# Patient Record
Sex: MALE | Race: WHITE | NOT HISPANIC OR LATINO | Employment: FULL TIME | URBAN - NONMETROPOLITAN AREA
[De-identification: names, ages, dates, MRNs, and addresses within clinical notes are randomized per-mention and may not be internally consistent; named-entity substitution may affect disease eponyms.]

---

## 2017-06-10 DIAGNOSIS — R56.9 SEIZURE (HCC): ICD-10-CM

## 2017-06-12 RX ORDER — PHENYTOIN SODIUM 100 MG/1
CAPSULE, EXTENDED RELEASE ORAL
Qty: 540 CAPSULE | Refills: 1 | OUTPATIENT
Start: 2017-06-12

## 2017-06-16 ENCOUNTER — OFFICE VISIT (OUTPATIENT)
Dept: FAMILY MEDICINE CLINIC | Facility: CLINIC | Age: 57
End: 2017-06-16

## 2017-06-16 VITALS
OXYGEN SATURATION: 98 % | BODY MASS INDEX: 40.66 KG/M2 | WEIGHT: 284 LBS | RESPIRATION RATE: 12 BRPM | TEMPERATURE: 98.3 F | HEIGHT: 70 IN | SYSTOLIC BLOOD PRESSURE: 144 MMHG | HEART RATE: 83 BPM | DIASTOLIC BLOOD PRESSURE: 90 MMHG

## 2017-06-16 DIAGNOSIS — R56.9 SEIZURE (HCC): ICD-10-CM

## 2017-06-16 DIAGNOSIS — E66.01 MORBID OBESITY WITH BMI OF 40.0-44.9, ADULT (HCC): ICD-10-CM

## 2017-06-16 DIAGNOSIS — R56.9 SEIZURES (HCC): Primary | ICD-10-CM

## 2017-06-16 PROCEDURE — 99214 OFFICE O/P EST MOD 30 MIN: CPT | Performed by: NURSE PRACTITIONER

## 2017-06-16 RX ORDER — PHENYTOIN SODIUM 100 MG/1
CAPSULE, EXTENDED RELEASE ORAL
Qty: 540 CAPSULE | Refills: 1 | Status: SHIPPED | OUTPATIENT
Start: 2017-06-16 | End: 2017-12-01 | Stop reason: SDUPTHER

## 2017-06-16 NOTE — PROGRESS NOTES
Chief Complaint   Patient presents with   • Seizures     labs and refills        No Known Allergies    HPI:  Brady Merlos is a 56 year old white male here for chronic visit for labs and med refills.  He has seizures and they are controlled with dilantin.  No complaints today, last seizure was > 30 yrs ago.  Has Sleep apnea       Past Medical History:   Diagnosis Date   • Hyperlipidemia    • Obesity    • Seizures    • Sleep apnea in adult      Past Surgical History:   Procedure Laterality Date   • SHOULDER SURGERY Left      Social History     Social History   • Marital status:      Spouse name: N/A   • Number of children: N/A   • Years of education: N/A     Social History Main Topics   • Smoking status: Never Smoker   • Smokeless tobacco: Never Used   • Alcohol use No   • Drug use: No   • Sexual activity: Defer     Other Topics Concern   • None     Social History Narrative     Family History   Problem Relation Age of Onset   • Diabetes Mother    • Hypertension Mother    • Diabetes Father    • Cancer Father    • No Known Problems Sister    • No Known Problems Brother    • Cancer Paternal Grandmother    • Heart disease Paternal Grandfather    • No Known Problems Sister    • No Known Problems Brother    • No Known Problems Sister    • No Known Problems Sister        Current Outpatient Prescriptions on File Prior to Visit   Medication Sig Dispense Refill   • DILANTIN 100 MG ER capsule 3 capsule in am 3 capsule pm  BRAND NAME ONLY  Indications: BRAND NAME ONLY 540 capsule 1     No current facility-administered medications on file prior to visit.         REVIEW OF SYMPTOMS: (Positives bolded), NEGATIVE TODAY  Respiratory: shortness of breath, cough, hemoptysis, wheezing, pleurisy,   Cardiovascular:  chest pain, PND, palpitation, edema, orthopnea, syncope, swelling of extremities  Gastro: Nausea, vomiting, diarrhea, hematemesis, abdominal pain, constipation  Genito: hematuria, dysuria, glycosuria, hesitancy,  "frequency, incontinence  Musckelo: Arthralgia, myalgia, muscle weakness, joint swelling, NSAID use  Skin: rash, pruritis, sores, nail changes, skin thickening, change in wart/mole, itching, rash, new lesions, pruritus, nail changes  Neuro:  Migraine, numbness, ataxia, tremor, vertigo, weakness, memory loss,  \"All other systems reviewed and negative, except as listed above.”      OBJECTIVE:  Constitutional:  Appearance-No acute distress, Consistent with stated age. Orientation- Oriented x 3, alert Posture-Not doubled over. Gait-Normal pace, normal arm movement. Posture- Normal Build and Nutrition-Well developed and well nourished.  General- Patient is pleasant and cooperative with the interview and exam.    Integumentary: General-No rashes, ulcers or lesions. No edema.  Palpation- Normal skin moisture/turgor. Skin is warm to touch, no increased warmth. Capillary refill is normal bilateral Upper and lower extremity.     Head/Neck: Head- normocephalic and atraumatic.  Neck- without visible/palpable lumps or pulsations.  Palpation- No bony tenderness about head/neck along frontal, occiptial, temporal, parietal, mastoid, jawline, zygoma, orbit or any other location.  NO temporal artery tenderness. No TMJ tenderness. Normal cervical ROM.   Neck Supple.  Thyroid-No thyromegaly, no nodules    Eye: Bilaterally PERRLA, EOMI.  No discharge.  Upper and lower eyelids are normal. Sclera/conjunctiva normal without discharge. Cornea is normal and clear. Lens is normal.  Eyeball appears normal. No ciliary flushing, no conjunctival injection.    ENMT:  Pinna- normal without tenderness or erythema.  External auditory canal Left- normal without erythema or discharge, no excessive cerumen. External auditory canal Right-normal without erythema or discharge, no excessive cerumen. TM left- Grey/pearly, normal light reflex and anatomy TM Right- Grey/pearly, normal light reflex and anatomy Hearing Assessment-normal to conversational speech " at 2-5 feet.  Nose and sinus-No sinus tenderness along frontal/maxillary region. External appearance normal and midline. Nares- bilateral quiet airflow, no discharge. Nasal mucosa- No bleeding noted and no ulcerations observed. Pink, moist. Turbinates non boggy. Lips- normal color, moist without cracks/lesions Oral Cavity/Palate- hard/soft palate intact without lesions, oral mucosa pink and moist. Dentition assessed and discussed appropriate oral care. Tongue normal midline.  Oropharynx- no pharyngeal erythema, Uvula midline. No post nasal drip. No exudate. Salivary glands- Non tender to palpation    CHEST/LUNG: Inspection- symmetric chest wall no pectus deformity. Normal effort, no distress, no use of accessory muscles. Palpation- nontender sternum, ribline.  No abnormal pulsations. Auscultation- Breath sounds normal throughout all lung fields.  Normal tracheal sounds, Normal bronchial sounds overlying sternum, Bronchovessicular sounds normal between scapulae posteriorly, Normal vessicular breath sounds heard throughout periphery. Lungs are clear today. Adventitious sounds- No wheezes, rales, rhonchi.     CARDIOVASCULAR:  Carotid artery- normal, no bruits or abnormal pulsations. Jugular vein- no pulsations. Palpation/Percussion- Normal PMI, no palpable thrill  Auscultation- Regular rate and rhythm. No murmur noted in sitting, supine positions. Extremities- no digital clubbing, cyanosis, edema, increased warmth.    ABDOMEN: Inspection- normal and no visible pulsations. Normal contour. Auscultation- Bowel sounds normal, no abdominal bruits. Palpation/Percussion- soft, non-tender, no rebound tenderness, no rigidity (guarding), no jar tenderness, no masses.  Liver-no hepatomegaly, Spleen - no splenomegaly, Hernias- none. Rectal not examined.     Peripheral Vascular: Upper extremity Left- Normal temperature with pink nailbeds and no ulcerations.  Upper extremity Right- Normal temperature with pink nailbeds and no  ulcerations.  Lower extremity- Normal temperature with pink nailbeds and no ulcerations. DP pulses 2+ bilaterally.  Pedal hair intact.  Normal capillary refill. Edema- No edema.    Neuro: - Person, place, time. (AAOx3), Mood/affect- normal and congruent. Able to articulate well. Speech-Normal speech, normal rate, normal tone, normal use of language, volume and coherence.  Thought content- normal with ability to perform basic computations and apply abstract thought/reason. Associations- intact, no SI/HI, no hallucinations, delusions, obsessions.  Judgment/insight- Appropriate. Memory-Recall intact, remote and recent memory intact. Knowledge- Age appropriate fund of knowledge, concentration and attention span normal.    Lymphatic: Head/Neck- normal size and non tender to palpation. Axillary- Head and neck LN are normal size and non tender to palpation. Femoral and Inguinal- normal size and non tender to palpation.      Assessment/Plan:  Brady was seen today for seizures.    Diagnoses and all orders for this visit:      Seizure-     Phenytoin Level, Total          -     DILANTIN 100 MG ER capsule; 3 capsule in am 3 capsule pm  BRAND NAME ONLY  Indications: BRAND NAME ONLY    Morbid obesity with BMI of 40.0-44.9, adult    1. Obesity: Federal guidelines recommend that people under the age of 65 should have a BMI of 18.5-24.9 and people age 65 and older should have a BMI of 23-30. The patient BMI 40.8 is outside this range and we recommended/discussed today to utilize a diet/exercise program to get back into the appropriate range.  Today we encouraged roughly a 1 lb per week weight loss with initial goal of 5% weight loss. The initial step is to document everything that is consumed into a food diary.  Studies have shown that patients can lose up to 2x the weight by keeping track of foods.     a. Discussed if eating out for a meal, consider cutting food in half and placing into to-go container.  Individually portion any  foods coming into the home based on package.    b. Consider using smaller plates.    c. Consider drinking 12 oz of water 30 minutes before meal as way to suppress appetite.   d. Cut back on soft drinks/juices.  Discussed 1 can of regular soft drink has roughly 150-170 Calories per day.    e. Increase exercise as able. It is recommended to try to exercise minimum 10 minutes 5 days per week.  The goal over the next 2-4 weeks is to walk 30 minutes per day 5 days per week at pace difficult to hold conversation.       Return in about 6 months (around 12/16/2017).     Jamee Medina, DNP, APRN-BC  06/16/2017

## 2017-06-16 NOTE — PATIENT INSTRUCTIONS
Nonepileptic Seizures  Nonepileptic seizures are seizures that are not caused by abnormal electrical signals in your brain. These seizures often seem like epileptic seizures, but they are not caused by epilepsy.   There are two types of nonepileptic seizures:  · A physiologic nonepileptic seizure results from a disruption in your brain.  · A psychogenic seizure results from emotional stress. These seizures are sometimes called pseudoseizures.  CAUSES   Causes of physiologic nonepileptic seizures include:   · Sudden drop in blood pressure.  · Low blood sugar.  · Low levels of salt (sodium) in your blood.  · Low levels of calcium in your blood.  · Migraine.  · Heart rhythm problems.  · Sleep disorders.  · Drug and alcohol abuse.  Common causes of psychogenic nonepileptic seizures include:  · Stress.  · Emotional trauma.  · Sexual or physical abuse.  · Major life events, such as divorce or the death of a loved one.  · Mental health disorders, including panic attack and hyperactivity disorder.  SIGNS AND SYMPTOMS  A nonepileptic seizure can look like an epileptic seizure, including uncontrollable shaking (convulsions), or changes in attention, behavior, or the ability to remain awake and alert. However, there are some differences. Nonepileptic seizures usually:  · Do not cause physical injuries.  · Start slowly.  · Include crying or shrieking.  · Last longer than 2 minutes.  · Have a short recovery time without headache or exhaustion.  DIAGNOSIS   Your health care provider can usually diagnose nonepileptic seizures after taking your medical history and giving you a physical exam. Your health care provider may want to talk to your friends or relatives who have seen you have a seizure.   You may also need to have tests to look for causes of physiologic nonepileptic seizures. This may include an electroencephalogram (EEG), which is a test that measures electrical activity in your brain. If you have had an epileptic  seizure, the results of your EEG will be abnormal. If your health care provider thinks you have had a psychogenic nonepileptic seizure, you may need to see a mental health specialist for an evaluation.  TREATMENT   Treatment depends on the type and cause of your seizures.  · For physiologic nonepileptic seizures, treatment is aimed at addressing the underlying condition that caused the seizures. These seizures usually stop when the underlying condition is properly treated.  · Nonepileptic seizures do not respond to the seizure medicines used to treat epilepsy.  · For psychogenic seizures, you may need to work with a mental health specialist.  HOME CARE INSTRUCTIONS  Home care will depend on the type of nonepileptic seizures you have.   · Follow all your health care provider's instructions.  · Keep all your follow-up appointments.  SEEK MEDICAL CARE IF:  You continue to have seizures after treatment.  SEEK IMMEDIATE MEDICAL CARE IF:  · Your seizures change or become more frequent.  · You injure yourself during a seizure.  · You have one seizure after another.  · You have trouble recovering from a seizure.  · You have chest pain or trouble breathing.  MAKE SURE YOU:  · Understand these instructions.  · Will watch your condition.  · Will get help right away if you are not doing well or get worse.     This information is not intended to replace advice given to you by your health care provider. Make sure you discuss any questions you have with your health care provider.     Document Released: 02/02/2007 Document Revised: 01/08/2016 Document Reviewed: 10/14/2014  [employer] Interactive Patient Education ©2017 [employer]

## 2017-06-17 LAB — PHENYTOIN SERPL-MCNC: 17.3 MCG/ML (ref 10–20)

## 2017-12-01 ENCOUNTER — OFFICE VISIT (OUTPATIENT)
Dept: FAMILY MEDICINE CLINIC | Facility: CLINIC | Age: 57
End: 2017-12-01

## 2017-12-01 VITALS
BODY MASS INDEX: 41.97 KG/M2 | RESPIRATION RATE: 18 BRPM | DIASTOLIC BLOOD PRESSURE: 82 MMHG | SYSTOLIC BLOOD PRESSURE: 138 MMHG | HEIGHT: 70 IN | OXYGEN SATURATION: 99 % | WEIGHT: 293.2 LBS | HEART RATE: 76 BPM | TEMPERATURE: 97.9 F

## 2017-12-01 DIAGNOSIS — R56.9 SEIZURE (HCC): ICD-10-CM

## 2017-12-01 PROCEDURE — 99213 OFFICE O/P EST LOW 20 MIN: CPT | Performed by: NURSE PRACTITIONER

## 2017-12-01 RX ORDER — PHENYTOIN SODIUM 100 MG/1
CAPSULE, EXTENDED RELEASE ORAL
Qty: 540 CAPSULE | Refills: 1 | Status: SHIPPED | OUTPATIENT
Start: 2017-12-01 | End: 2018-06-08 | Stop reason: SDUPTHER

## 2017-12-01 NOTE — PATIENT INSTRUCTIONS
Seizure, Adult  A seizure is unusual activity in the brain. A seizure can cause changes in attention or behavior. Seizures often cause shaking (convulsions). Seizures often last from 30 seconds to 2 minutes. There are different types of seizures. Before a seizure starts, you may have a warning feeling (aura). You may:  · Feel afraid or worried.  · Feel sick to your stomach (nauseous).  · Feel like the room is spinning (vertigo).  · Have vision changes. You may see flashing lights or spots.  After a seizure, you may feel confused or sleepy. You may also have an injury that was caused by shaking during the seizure.  HOME CARE  · Take over-the-counter and prescription medicines only as told by your doctor.  · Keep all follow-up visits as told by your doctor.  · Do not swim, drive, or do any activity that would cause danger to you or others if you would have a seizure during the activity. Ask your doctor when it is okay for you to do those activities again.  · Get enough rest.  · Teach your friends and family what to do if you have a seizure. They should:    Lay you on the ground.    Put a cushion or something soft under your head.    Loosen any tight clothing around your neck.    Turn you on your side.    Stay with you until you get better.    Know if you need emergency care.    Not put objects or fingers in your mouth.  GET HELP RIGHT AWAY IF:  · The seizure lasts longer than 5 minutes.  · The seizure is very bad.  · You do not wake up right after the seizure.  · You have a change in how you think, feel, or act (mental status) after the seizure.  · Your seizures are getting worse.  · You are having seizures more often.  Someone should drive you to the emergency room or call local emergency services (911 in the [place] Do not drive yourself to the hospital.     This information is not intended to replace advice given to you by your health care provider. Make sure you discuss any questions you have with your health care  provider.     Document Released: 06/05/2009 Document Revised: 04/10/2017 Document Reviewed: 07/30/2014  [employer] Interactive Patient Education ©2017 [employer]

## 2017-12-01 NOTE — PROGRESS NOTES
Chief Complaint   Patient presents with   • Dilantin level     Patient is here today to have his Dilantin levels checked.       No Known Allergies    HPI:  Brady Merlos is a 56 y.o. male presents today follow up on seizures which is controlled with dilantin.  Has not had a seizure for 30 years.   No complaints today.     Past Medical History:   Diagnosis Date   • Hyperlipidemia    • Obesity    • Seizures    • Sleep apnea in adult      Past Surgical History:   Procedure Laterality Date   • SHOULDER SURGERY Left      Social History     Social History   • Marital status:      Spouse name: N/A   • Number of children: N/A   • Years of education: N/A     Social History Main Topics   • Smoking status: Never Smoker   • Smokeless tobacco: Never Used   • Alcohol use No   • Drug use: No   • Sexual activity: Defer     Other Topics Concern   • None     Social History Narrative     Family History   Problem Relation Age of Onset   • Diabetes Mother    • Hypertension Mother    • Diabetes Father    • Cancer Father    • No Known Problems Sister    • No Known Problems Brother    • Cancer Paternal Grandmother    • Heart disease Paternal Grandfather    • No Known Problems Sister    • No Known Problems Brother    • No Known Problems Sister    • No Known Problems Sister        Current Outpatient Prescriptions on File Prior to Visit   Medication Sig Dispense Refill   • DILANTIN 100 MG ER capsule 3 capsule in am 3 capsule pm  BRAND NAME ONLY  Indications: BRAND NAME ONLY 540 capsule 1     No current facility-administered medications on file prior to visit.         REVIEW OF SYMPTOMS: (Positives bolded) all negative  General:  weight loss, fever, chills, night sweats, fatigue, appetite loss  Respiratory: shortness of breath, cough, hemoptysis, wheezing, pleurisy,   Cardiovascular:  chest pain, PND, palpitation, edema, orthopnea, syncope, swelling of extremities  Gastro: Nausea, vomiting, diarrhea, hematemesis, abdominal pain,  "constipation  Genito: hematuria, dysuria, glycosuria, hesitancy, frequency, incontinence  Musckelo: Arthralgia, myalgia, muscle weakness, joint swelling, NSAID use  Skin: rash, pruritis, sores, nail changes, skin thickening, change in wart/mole, itching, rash, new lesions, pruritus, nail changes  Neuro:  Migraine, numbness, ataxia, tremor, vertigo, weakness, memory loss,  \"All other systems reviewed and negative, except as listed above.”      OBJECTIVE:  Constitutional:  Appearance-No acute distress, Consistent with stated age. Orientation- Oriented x 3, alert Posture-Not doubled over. Gait-Normal pace, normal arm movement. Posture- Normal Build and Nutrition-Well developed and well nourished.  General- Patient is pleasant and cooperative with the interview and exam.    Integumentary: General-No rashes, ulcers or lesions. No edema.  Palpation- Normal skin moisture/turgor. Skin is warm to touch, no increased warmth. Capillary refill is normal bilateral Upper and lower extremity.     Head/Neck: Head- normocephalic and atraumatic.  Neck- without visible/palpable lumps or pulsations.  Palpation- No bony tenderness about head/neck along frontal, occiptial, temporal, parietal, mastoid, jawline, zygoma, orbit or any other location.  NO temporal artery tenderness. No TMJ tenderness. Normal cervical ROM.   Neck Supple.  Thyroid-No thyromegaly, no nodules    Eye: Bilaterally PERRLA, EOMI.  No discharge.  Upper and lower eyelids are normal. Sclera/conjunctiva normal without discharge. Cornea is normal and clear. Lens is normal.  Eyeball appears normal. No ciliary flushing, no conjunctival injection.    ENMT:  Pinna- normal without tenderness or erythema.  External auditory canal Left- normal without erythema or discharge, no excessive cerumen. External auditory canal Right-normal without erythema or discharge, no excessive cerumen. TM left- Grey/pearly, normal light reflex and anatomy TM Right- Grey/pearly, normal light reflex " and anatomy Hearing Assessment-normal to conversational speech at 2-5 feet.  Nose and sinus-No sinus tenderness along frontal/maxillary region. External appearance normal and midline. Nares- bilateral quiet airflow, no discharge. Nasal mucosa- No bleeding noted and no ulcerations observed. Pink, moist. Turbinates non boggy. Lips- normal color, moist without cracks/lesions Oral Cavity/Palate- hard/soft palate intact without lesions, oral mucosa pink and moist. Dentition assessed and discussed appropriate oral care. Tongue normal midline.  Oropharynx- no pharyngeal erythema, Uvula midline. No post nasal drip. No exudate. Salivary glands- Non tender to palpation    CHEST/LUNG: Inspection- symmetric chest wall no pectus deformity. Normal effort, no distress, no use of accessory muscles. Palpation- nontender sternum, ribline.  No abnormal pulsations. Auscultation- Breath sounds normal throughout all lung fields.  Normal tracheal sounds, Normal bronchial sounds overlying sternum, Bronchovessicular sounds normal between scapulae posteriorly, Normal vessicular breath sounds heard throughout periphery. Lungs are clear today. Adventitious sounds- No wheezes, rales, rhonchi.     CARDIOVASCULAR:  Carotid artery- normal, no bruits or abnormal pulsations. Jugular vein- no pulsations. Palpation/Percussion- Normal PMI, no palpable thrill  Auscultation- Regular rate and rhythm. No murmur noted in sitting, supine positions. Extremities- no digital clubbing, cyanosis, edema, increased warmth.    Peripheral Vascular: Upper extremity Left- Normal temperature with pink nailbeds and no ulcerations.  Upper extremity Right- Normal temperature with pink nailbeds and no ulcerations.  Lower extremity- Normal temperature with pink nailbeds and no ulcerations. DP pulses 2+ bilaterally.  Pedal hair intact.  Normal capillary refill. Edema- No edema.      Neuropsych: Oriented- Person, place, time. (AAOx3), Mood/affect- normal and congruent. Able to  articulate well. Speech-Normal speech, normal rate, normal tone, normal use of language, volume and coherence.  Thought content- normal with ability to perform basic computations and apply abstract thought/reason. Associations- intact, no SI/HI, no hallucinations, delusions, obsessions.  Judgment/insight- Appropriate. Memory-Recall intact, remote and recent memory intact. Knowledge- Age appropriate fund of knowledge, concentration and attention span normal.    Lymphatic: Head/Neck- normal size and non tender to palpation. Axillary- Head and neck LN are normal size and non tender to palpation. Femoral and Inguinal- normal size and non tender to palpation.      Assessment/Plan:  Brady was seen today for dilantin level.    Diagnoses and all orders for this visit:    Seizure  -     DILANTIN 100 MG ER capsule; 3 capsule in am 3 capsule pm  BRAND NAME ONLY  Indications: BRAND NAME ONLY  -     Phenytoin level, total        -     Phenytoin level has been stable    Return in about 6 months (around 6/1/2018), or if symptoms worsen or fail to improve.     Jamee Medina, DNP, APRN-BC  12/01/2017

## 2017-12-02 LAB — PHENYTOIN SERPL-MCNC: 15.3 UG/ML (ref 10–20)

## 2018-05-31 DIAGNOSIS — R56.9 SEIZURE (HCC): ICD-10-CM

## 2018-05-31 RX ORDER — PHENYTOIN SODIUM 100 MG/1
CAPSULE, EXTENDED RELEASE ORAL
Qty: 540 CAPSULE | Refills: 1 | OUTPATIENT
Start: 2018-05-31

## 2018-06-08 ENCOUNTER — OFFICE VISIT (OUTPATIENT)
Dept: FAMILY MEDICINE CLINIC | Facility: CLINIC | Age: 58
End: 2018-06-08

## 2018-06-08 VITALS
BODY MASS INDEX: 40.89 KG/M2 | WEIGHT: 285.6 LBS | HEIGHT: 70 IN | HEART RATE: 77 BPM | RESPIRATION RATE: 18 BRPM | OXYGEN SATURATION: 98 % | SYSTOLIC BLOOD PRESSURE: 122 MMHG | DIASTOLIC BLOOD PRESSURE: 80 MMHG | TEMPERATURE: 98.2 F

## 2018-06-08 VITALS
HEIGHT: 70 IN | DIASTOLIC BLOOD PRESSURE: 80 MMHG | RESPIRATION RATE: 18 BRPM | BODY MASS INDEX: 40.89 KG/M2 | TEMPERATURE: 98.2 F | HEART RATE: 77 BPM | SYSTOLIC BLOOD PRESSURE: 122 MMHG | OXYGEN SATURATION: 98 % | WEIGHT: 285.6 LBS

## 2018-06-08 DIAGNOSIS — M79.604 PAIN IN BOTH LOWER EXTREMITIES: ICD-10-CM

## 2018-06-08 DIAGNOSIS — R56.9 SEIZURE (HCC): Primary | ICD-10-CM

## 2018-06-08 DIAGNOSIS — Z00.00 WELL ADULT EXAM: Primary | ICD-10-CM

## 2018-06-08 DIAGNOSIS — Z13.6 SCREENING FOR ISCHEMIC HEART DISEASE (IHD): ICD-10-CM

## 2018-06-08 DIAGNOSIS — Z13.6 SCREENING FOR CARDIOVASCULAR CONDITION: ICD-10-CM

## 2018-06-08 DIAGNOSIS — M79.605 PAIN IN BOTH LOWER EXTREMITIES: ICD-10-CM

## 2018-06-08 PROCEDURE — 99214 OFFICE O/P EST MOD 30 MIN: CPT | Performed by: NURSE PRACTITIONER

## 2018-06-08 PROCEDURE — 99396 PREV VISIT EST AGE 40-64: CPT | Performed by: NURSE PRACTITIONER

## 2018-06-08 RX ORDER — PHENYTOIN SODIUM 100 MG/1
CAPSULE, EXTENDED RELEASE ORAL
Qty: 540 CAPSULE | Refills: 1 | Status: SHIPPED | OUTPATIENT
Start: 2018-06-08 | End: 2018-11-25 | Stop reason: SDUPTHER

## 2018-06-08 RX ORDER — DICLOFENAC SODIUM 75 MG/1
75 TABLET, DELAYED RELEASE ORAL 2 TIMES DAILY
Qty: 60 TABLET | Refills: 0 | Status: SHIPPED | OUTPATIENT
Start: 2018-06-08 | End: 2018-07-09 | Stop reason: SDUPTHER

## 2018-06-08 NOTE — PATIENT INSTRUCTIONS
Knee Pain, Adult  Many things can cause knee pain. The pain often goes away on its own with time and rest. If the pain does not go away, tests may be done to find out what is causing the pain.  Follow these instructions at home:  Activity   · Rest your knee.  · Do not do things that cause pain.  · Avoid activities where both feet leave the ground at the same time (high-impact activities). Examples are running, jumping rope, and doing jumping jacks.  General instructions   · Take medicines only as told by your doctor.  · Raise (elevate) your knee when you are resting. Make sure your knee is higher than your heart.  · Sleep with a pillow under your knee.  · If told, put ice on the knee:  ¨ Put ice in a plastic bag.  ¨ Place a towel between your skin and the bag.  ¨ Leave the ice on for 20 minutes, 2-3 times a day.  · Ask your doctor if you should wear an elastic knee support.  · Lose weight if you are overweight. Being overweight can make your knee hurt more.  · Do not use any tobacco products. These include cigarettes, chewing tobacco, or electronic cigarettes. If you need help quitting, ask your doctor. Smoking may slow down healing.  Contact a doctor if:  · The pain does not stop.  · The pain changes or gets worse.  · You have a fever along with knee pain.  · Your knee gives out or locks up.  · Your knee swells, and becomes worse.  Get help right away if:  · Your knee feels warm.  · You cannot move your knee.  · You have very bad knee pain.  · You have chest pain.  · You have trouble breathing.  Summary  · Many things can cause knee pain. The pain often goes away on its own with time and rest.  · Avoid activities that put stress on your knee. These include running and jumping rope.  · Get help right away if you cannot move your knee, or if your knee feels warm, or if you have trouble breathing.  This information is not intended to replace advice given to you by your health care provider. Make sure you discuss any  questions you have with your health care provider.  Document Released: 03/16/2010 Document Revised: 12/12/2017 Document Reviewed: 12/12/2017  [employer] Interactive Patient Education © 2017 [employer]

## 2018-06-08 NOTE — PROGRESS NOTES
Chief Complaint   Patient presents with   • Seizures     Medication Refill           HPI  Brady Merlos is a 57 y.o. male presents today for follow up for seizures,  Has not had any seizures, doing well does complain of leg pain from time to time relieved by advil.  Rates pain 2/10      Chronic problems:  Seizures stable with dilantin.     PCP:  Jamee Medina, DNP, APRN    No Known Allergies    Past Medical History:   Diagnosis Date   • Hyperlipidemia    • Obesity    • Seizures    • Sleep apnea in adult        Past Surgical History:   Procedure Laterality Date   • SHOULDER SURGERY Left        Social History     Social History   • Marital status:      Social History Main Topics   • Smoking status: Never Smoker   • Smokeless tobacco: Never Used   • Alcohol use No   • Drug use: No   • Sexual activity: Defer     Other Topics Concern   • Not on file       Family History   Problem Relation Age of Onset   • Diabetes Mother    • Hypertension Mother    • Diabetes Father    • Cancer Father    • No Known Problems Sister    • No Known Problems Brother    • Cancer Paternal Grandmother    • Heart disease Paternal Grandfather    • No Known Problems Sister    • No Known Problems Brother    • No Known Problems Sister    • No Known Problems Sister        Current Outpatient Prescriptions on File Prior to Visit   Medication Sig Dispense Refill   • DILANTIN 100 MG ER capsule 3 capsule in am 3 capsule pm  BRAND NAME ONLY  Indications: BRAND NAME ONLY 540 capsule 1     No current facility-administered medications on file prior to visit.         REVIEW OF SYMPTOMS: (Positives bolded)    General:  weight loss, fever, chills, night sweats, fatigue, appetite loss  HEENT:  blurry vision, eye pain, eye discharge, dry eyes, decreased vision  Respiratory: shortness of breath, cough, hemoptysis, wheezing, pleurisy,   Cardiovascular:  chest pain, PND, palpitation, edema, orthopnea, syncope, swelling of extremities  Gastro: Nausea,  "vomiting, diarrhea, hematemesis, abdominal pain, constipation  Genito: hematuria, dysuria, glycosuria, hesitancy, frequency, incontinence  Musckelo: Arthralgia, myalgia, muscle weakness, joint swelling, NSAID use  Skin: rash, pruritis, sores, nail changes, skin thickening, change in wart/mole, itching, rash, new lesions, pruritus, nail changes  Neuro:  Migraine, numbness, ataxia, tremor, vertigo, weakness, memory loss  \"All other systems reviewed and negative, except as listed above.”      OBJECTIVE:  Constitutional:  Appearance-No acute distress, Consistent with stated age. Orientation- Oriented x 3, alert Posture-Not doubled over. Gait-Normal pace, normal arm movement. Posture- Normal Build and Nutrition-Well developed and well nourished.  General- Patient is pleasant and cooperative with the interview and exam.    Integumentary: General-No rashes, ulcers or lesions. No edema.  Palpation- Normal skin moisture/turgor. Skin is warm to touch, no increased warmth. Capillary refill is normal bilateral Upper and lower extremity.     Head/Neck: Head- normocephalic and atraumatic.  Neck- without visible/palpable lumps or pulsations.  Palpation- No bony tenderness about head/neck along frontal, occiptial, temporal, parietal, mastoid, jawline, zygoma, orbit or any other location.  NO temporal artery tenderness. No TMJ tenderness. Normal cervical ROM.   Neck Supple.  Thyroid-No thyromegaly, no nodules    Eye: Bilaterally PERRLA, EOMI.  No discharge.  Upper and lower eyelids are normal. Sclera/conjunctiva normal without discharge. Cornea is normal and clear. Lens is normal.  Eyeball appears normal. No ciliary flushing, no conjunctival injection.    ENMT:  Pinna- normal without tenderness or erythema.  External auditory canal Left- normal without erythema or discharge, no excessive cerumen. External auditory canal Right-normal without erythema or discharge, no excessive cerumen. TM left- Grey/pearly, normal light reflex and " anatomy TM Right- Grey/pearly, normal light reflex and anatomy Hearing Assessment-normal to conversational speech at 2-5 feet.  Nose and sinus-No sinus tenderness along frontal/maxillary region. External appearance normal and midline. Nares- bilateral quiet airflow, no discharge. Nasal mucosa- No bleeding noted and no ulcerations observed. Pink, moist. Turbinates non boggy. Lips- normal color, moist without cracks/lesions Oral Cavity/Palate- hard/soft palate intact without lesions, oral mucosa pink and moist. Dentition assessed and discussed appropriate oral care. Tongue normal midline.  Oropharynx- no pharyngeal erythema, Uvula midline. No post nasal drip. No exudate. Salivary glands- Non tender to palpation    CHEST/LUNG: Inspection- symmetric chest wall no pectus deformity. Normal effort, no distress, no use of accessory muscles. Palpation- nontender sternum, ribline.  No abnormal pulsations. Auscultation- Breath sounds normal throughout all lung fields.  Normal tracheal sounds, Normal bronchial sounds overlying sternum, Bronchovessicular sounds normal between scapulae posteriorly, Normal vessicular breath sounds heard throughout periphery. Lungs are clear today. Adventitious sounds- No wheezes, rales, rhonchi.     CARDIOVASCULAR:  Carotid artery- normal, no bruits or abnormal pulsations. Jugular vein- no pulsations. Palpation/Percussion- Normal PMI, no palpable thrill  Auscultation- Regular rate and rhythm. No murmur noted in sitting, supine positions. Extremities- no digital clubbing, cyanosis, edema, increased warmth.    ABDOMEN: Inspection- normal and no visible pulsations. Normal contour. Auscultation- Bowel sounds normal, no abdominal bruits. Palpation/Percussion- soft, non-tender, no rebound tenderness, no rigidity (guarding), no jar tenderness, no masses.  Liver-no hepatomegaly, Spleen - no splenomegaly, Hernias- none. Rectal not examined.     Peripheral Vascular: Upper extremity Left- Normal temperature  with pink nailbeds and no ulcerations.  Upper extremity Right- Normal temperature with pink nailbeds and no ulcerations.  Lower extremity- Normal temperature with pink nailbeds and no ulcerations. DP pulses 2+ bilaterally.  Pedal hair intact.  Normal capillary refill. Edema- No edema.    Neurological: General- Moves all 4 extremities symmetrically. Symmetrical face and body posture. Cranial nerves- individually evaluated II-XII and intact. PERRLA, Normal EOMI, visual/special senses appear intact, Face is symmetrical and normal sensation/movement, normal tongue, normal strength/posture of neck musculature. Balance- Romberg intact.  Reflexes- ntact with DTR 2+ patellar, Achilles, bicep, brachial,tricep. Ankle clonus normal with 2 beats.  Strength- 5/5 bilateral UE and LE. Soft touch- intact bilateral UE and LE.  Temperature sensation- intact bilateral UE and LE. Cerebellar testing-Rapid alternating movements intact.  Heel shin intact. Able to walk normal gait, normal heel toe walking. Neck- supple.        Bilateral Knees:   Has good rom, good pulses, straight leg raises normal, has tenderness on palpation of the whole knee bilateral.     Neuropsych: Oriented- Person, place, time. (AAOx3), Mood/affect- normal and congruent. Able to articulate well. Speech-Normal speech, normal rate, normal tone, normal use of language, volume and coherence.  Thought content- normal with ability to perform basic computations and apply abstract thought/reason. Associations- intact, no SI/HI, no hallucinations, delusions, obsessions.  Judgment/insight- Appropriate. Memory-Recall intact, remote and recent memory intact. Knowledge- Age appropriate fund of knowledge, concentration and attention span normal.    Lymphatic: Head/Neck- normal size and non tender to palpation. Axillary- Head and neck LN are normal size and non tender to palpation. Femoral and Inguinal- normal size and non tender to palpation.      Assessment/Plan:  Brady gallagher  seen today for seizures.    Diagnoses and all orders for this visit:    Seizure  -     DILANTIN 100 MG ER capsule; 3 capsule in am 3 capsule pm  BRAND NAME ONLY  -     Phenytoin Level, Total    Pain in both lower extremities  -     diclofenac (VOLTAREN) 75 MG EC tablet; Take 1 tablet by mouth 2 (Two) Times a Day.      Morbid obesity:  BMI:    41.0   Weight:     285 pounds    Follow up plan:  Lose at least 3 pounds before next chronic visit, exercise at least 3 times a week for 30 minute increments, eat baked instead of fried foods, and eat healthier     -  Documentation of education   The patient BMI is outside this range and we recommended/discussed today to utilize a diet/exercise program to get back into the appropriate range.  Federal guidelines recommend that people under the age of 65 should have a BMI of 18.5-24.9  Food diary:  Bring to next visit  tial step is to document everything that is consumed. Pt's that have a food diary  Lose 2x the weight  by keeping track of foods.   Choose one bad food weekly and eliminate it from your diet.  Replace with one healthy food  Exercise diary: Goal over next 2-4 weeks is walk 30 minutes per day 5 days per week at pace difficult to hold conversation.   Drink more water, less soda.   Cut back on portion sizes.   Today we encouraged roughly a 1 lb per week weight loss with initial goal of 5% weight loss.  Avoid fast foods, eat more salads  If eating out for a meal, consider cutting food in half and placing into a to go container.  Individually portion any foods coming into the home   Use smaller plates  Drink 12 ozof water 30 minutes before meal as way to suppress appetite.  Discussed Contrave, metformin, topamax, Belviq and the cost of medications  Encouraged internet programs or self help books  Set  Goals that are realistic   Educated on ways to measure food  Baseball: 1 cup good for green salad, frozen yogurt, medium piece of fruit  Handful:  ½ cup good cut fruit,  cooked vegetables, pasta, rice  Egg:  ¼ cup good for dried fruit  Deck of cards: 3 ounces good for meat and poultry  Check book:  3 ounces grilled fish  Plate Method:  reduce plate size to 9 inch dinner plate.  Half of plate should be filled with non-starchy vegetables (broccoli, lettuce, cauliflower, tomatoes), ¼ plate with lean source of protein (lean chicken, turkey, fish), remaining ½ with whole grains (brown rice, potato, whole grain breads  Avoid liquid calories (regular soda, juice, coffee with cream)  Focus  on water, seltzer water and other non-calorie drinks  Replace regular sugar with non-caloric sweeteners  Avoid skipping meals: plan small regular meals throughout the day in order to keep your hunger controlled  Consider using meal replacements if unable to plan a healthy meal (protein shake, high protein bar)  Replace all white bread with whole wheat/whole grain alternative  Swap regular salad dressings (mayonnaise, butter, or low fat or fat free alternative)  Avoid high fat, high calorie, high carbohydrate snakes (cookies, pastries, cakes)  Snack on fruits, low fat dairy (yogurt, cottage cheese)            Management plan:  Take medications as prescribed; return to the clinic of new or worsening concerns.       Risks/benefits of current and new medications discussed with the patient and or family today.  The patient/family are aware and accept that if there any side effects they should call or return to clinic as soon as possible.  Appropriate F/U discussed for topics addressed today. All questions were answered to the satisfactory state of patient/family.  Should symptoms fail to improve or worsen they agree to call or return to clinic or to go to the ER. Education handouts were offered on any new Rx if requested.  Discussed the importance of following up with any needed screening tests/labs/specialist appointments and any requested follow-up recommended by me today.  Importance of maintaining  follow-up discussed and patient accepts that missed appointments can delay diagnosis and potentially lead to worsening of conditions.    Return in about 6 months (around 12/8/2018) for Recheck seizures.    Jamee Medina, DNP, APRN  6/8/2018

## 2018-06-08 NOTE — PROGRESS NOTES
Chief Complaint   Patient presents with   • Annual Exam      No Known Allergies    HPIP: Brady Merlos is a 57 y.o. male presents today for annual exam.  Has chronic problems of seizures stable with Dilantin 100 (3) caps daily.   Has been having problems with knee and leg pain.  Rates 2/10    Past Medical History:   Diagnosis Date   • Hyperlipidemia    • Obesity    • Seizures    • Sleep apnea in adult      Past Surgical History:   Procedure Laterality Date   • SHOULDER SURGERY Left      Social History     Social History   • Marital status:      Social History Main Topics   • Smoking status: Never Smoker   • Smokeless tobacco: Never Used   • Alcohol use No   • Drug use: No   • Sexual activity: Defer     Other Topics Concern   • Not on file     Family History   Problem Relation Age of Onset   • Diabetes Mother    • Hypertension Mother    • Diabetes Father    • Cancer Father    • No Known Problems Sister    • No Known Problems Brother    • Cancer Paternal Grandmother    • Heart disease Paternal Grandfather    • No Known Problems Sister    • No Known Problems Brother    • No Known Problems Sister    • No Known Problems Sister        Current Outpatient Prescriptions on File Prior to Visit   Medication Sig Dispense Refill   • [DISCONTINUED] DILANTIN 100 MG ER capsule 3 capsule in am 3 capsule pm  BRAND NAME ONLY  Indications: BRAND NAME ONLY 540 capsule 1     No current facility-administered medications on file prior to visit.         REVIEW OF SYMPTOMS: (Positives bolded) All negative   General:  weight loss, fever, chills, night sweats, fatigue, appetite loss  HEENT:  blurry vision, eye pain, eye discharge, dry eyes, decreased vision  Respiratory: shortness of breath, cough, hemoptysis, wheezing, pleurisy,   Neuro:  Migraine, numbness, ataxia, tremor, vertigo, weakness, memory loss, Irritability, dizziness  Endocrine: excessive thirst, polyuria, cold intolerance, heat intolerance, goiter  Musco: bilateral knee  "pain  Psychiatric: depression, anxiety, anti-depressants, alcohol abuse, drug abuse,   \"All other systems reviewed and negative, except as listed above.”    The following portions of the patient's history were reviewed and updated as appropriate: allergies, current medications, past family history, past medical history, past social history, past surgical history and problem list.    OBJECTIVE:  Constitutional:  Appearance-No acute distress, Consistent with stated age. Orientation- Oriented x 3, alert Posture-Not doubled over. Gait-Normal pace, normal arm movement. Posture- Normal Build and Nutrition-Well developed and well nourished.  General- Patient is pleasant and cooperative with the interview and exam.    Integumentary: General-No rashes, ulcers or lesions. No edema.  Palpation- Normal skin moisture/turgor. Skin is warm to touch, no increased warmth. Capillary refill is normal bilateral Upper and lower extremity.     Head/Neck: Head- normocephalic and atraumatic.  Neck- without visible/palpable lumps or pulsations.  Palpation- No bony tenderness about head/neck along frontal, occiptial, temporal, parietal, mastoid, jawline, zygoma, orbit or any other location.  NO temporal artery tenderness. No TMJ tenderness. Normal cervical ROM.   Neck Supple.  Thyroid-No thyromegaly, no nodules    Eye: Bilaterally PERRLA, EOMI.  No discharge.  Upper and lower eyelids are normal. Sclera/conjunctiva normal without discharge. Cornea is normal and clear. Lens is normal.  Eyeball appears normal. No ciliary flushing, no conjunctival injection.    ENMT:  Pinna- normal without tenderness or erythema.  External auditory canal Left- normal without erythema or discharge, no excessive cerumen. External auditory canal Right-normal without erythema or discharge, no excessive cerumen. TM left- Grey/pearly, normal light reflex and anatomy TM Right- Grey/pearly, normal light reflex and anatomy Hearing Assessment-normal to conversational " speech at 2-5 feet.  Nose and sinus-No sinus tenderness along frontal/maxillary region. External appearance normal and midline. Nares- bilateral quiet airflow, no discharge. Nasal mucosa- No bleeding noted and no ulcerations observed. Pink, moist. Turbinates non boggy. Lips- normal color, moist without cracks/lesions Oral Cavity/Palate- hard/soft palate intact without lesions, oral mucosa pink and moist. Dentition assessed and discussed appropriate oral care. Tongue normal midline.  Oropharynx- no pharyngeal erythema, Uvula midline. No post nasal drip. No exudate. Salivary glands- Non tender to palpation    CHEST/LUNG: Inspection- symmetric chest wall no pectus deformity. Normal effort, no distress, no use of accessory muscles. Palpation- nontender sternum, ribline.  No abnormal pulsations. Auscultation- Breath sounds normal throughout all lung fields.  Normal tracheal sounds, Normal bronchial sounds overlying sternum, Bronchovessicular sounds normal between scapulae posteriorly, Normal vessicular breath sounds heard throughout periphery. Lungs are clear today. Adventitious sounds- No wheezes, rales, rhonchi.     CARDIOVASCULAR:  Carotid artery- normal, no bruits or abnormal pulsations. Jugular vein- no pulsations. Palpation/Percussion- Normal PMI, no palpable thrill  Auscultation- Regular rate and rhythm. No murmur noted in sitting, supine positions. Extremities- no digital clubbing, cyanosis, edema, increased warmth.    ABDOMEN: Inspection- normal and no visible pulsations. Normal contour. Auscultation- Bowel sounds normal, no abdominal bruits. Palpation/Percussion- soft, non-tender, no rebound tenderness, no rigidity (guarding), no jar tenderness, no masses.  Liver-no hepatomegaly, Spleen - no splenomegaly, Hernias- none. Rectal not examined.     Peripheral Vascular: Upper extremity Left- Normal temperature with pink nailbeds and no ulcerations.  Upper extremity Right- Normal temperature with pink nailbeds and no  "ulcerations.  Lower extremity- Normal temperature with pink nailbeds and no ulcerations. DP pulses 2+ bilaterally.  Pedal hair intact.  Normal capillary refill. Edema- No edema.    Neurological: General- Moves all 4 extremities symmetrically. Symmetrical face and body posture. Cranial nerves- individually evaluated II-XII and intact. PERRLA, Normal EOMI, visual/special senses appear intact, Face is symmetrical and normal sensation/movement, normal tongue, normal strength/posture of neck musculature. Balance- Romberg intact.  Reflexes- ntact with DTR 2+ patellar, Achilles, bicep, brachial,tricep. Ankle clonus normal with 2 beats.  Strength- 5/5 bilateral UE and LE. Soft touch- intact bilateral UE and LE.  Temperature sensation- intact bilateral UE and LE. Cerebellar testing-Rapid alternating movements intact.  Heel shin intact. Able to walk normal gait, normal heel toe walking. Neck- supple.      Neuropsych: Oriented- Person, place, time. (AAOx3), Mood/affect- normal and congruent. Able to articulate well. Speech-Normal speech, normal rate, normal tone, normal use of language, volume and coherence.  Thought content- normal with ability to perform basic computations and apply abstract thought/reason. Associations- intact, no SI/HI, no hallucinations, delusions, obsessions.  Judgment/insight- Appropriate. Memory-Recall intact, remote and recent memory intact. Knowledge- Age appropriate fund of knowledge, concentration and attention span normal.    Lymphatic: Head/Neck- normal size and non tender to palpation. Axillary- Head and neck LN are normal size and non tender to palpation. Femoral and Inguinal- normal size and non tender to palpation.    /80 (BP Location: Left arm, Patient Position: Sitting, Cuff Size: Large Adult)   Pulse 77   Temp 98.2 °F (36.8 °C) (Oral)   Resp 18   Ht 177.8 cm (70\")   Wt 130 kg (285 lb 9.6 oz)   SpO2 98%   BMI 40.98 kg/m²        ASSESSMENT/PLAN  Brady was seen today for annual " exam.    Diagnoses and all orders for this visit:    Well adult exam  -     Lipid panel  -     CBC and Differential  -     Comprehensive metabolic panel    Screening for cardiovascular condition  -     Lipid panel  -     CBC and Differential  -     Comprehensive metabolic panel    Screening for ischemic heart disease (IHD)      Return in about 1 year (around 6/8/2019) for Annual physical.    Jamee Medina, IVETTE, APRN-BC  06/08/2018

## 2018-06-09 LAB
ALBUMIN SERPL-MCNC: 4.3 G/DL (ref 3.5–5)
ALBUMIN/GLOB SERPL: 1.7 G/DL (ref 1.1–2.5)
ALP SERPL-CCNC: 81 U/L (ref 24–120)
ALT SERPL-CCNC: 47 U/L (ref 0–54)
AST SERPL-CCNC: 27 U/L (ref 7–45)
BASOPHILS # BLD AUTO: 0.01 10*3/MM3 (ref 0–0.2)
BASOPHILS NFR BLD AUTO: 0.2 % (ref 0–2)
BILIRUB SERPL-MCNC: 0.3 MG/DL (ref 0.1–1)
BUN SERPL-MCNC: 14 MG/DL (ref 5–21)
BUN/CREAT SERPL: 20.3 (ref 7–25)
CALCIUM SERPL-MCNC: 9.1 MG/DL (ref 8.4–10.4)
CHLORIDE SERPL-SCNC: 102 MMOL/L (ref 98–110)
CHOLEST SERPL-MCNC: 183 MG/DL (ref 130–200)
CO2 SERPL-SCNC: 30 MMOL/L (ref 24–31)
CREAT SERPL-MCNC: 0.69 MG/DL (ref 0.5–1.4)
EOSINOPHIL # BLD AUTO: 0.1 10*3/MM3 (ref 0–0.7)
EOSINOPHIL NFR BLD AUTO: 1.9 % (ref 0–4)
ERYTHROCYTE [DISTWIDTH] IN BLOOD BY AUTOMATED COUNT: 14.8 % (ref 12–15)
GFR SERPLBLD CREATININE-BSD FMLA CKD-EPI: 118 ML/MIN/1.73
GFR SERPLBLD CREATININE-BSD FMLA CKD-EPI: 143 ML/MIN/1.73
GLOBULIN SER CALC-MCNC: 2.5 GM/DL
GLUCOSE SERPL-MCNC: 94 MG/DL (ref 70–100)
HCT VFR BLD AUTO: 47.1 % (ref 40–52)
HDLC SERPL-MCNC: 56 MG/DL
HGB BLD-MCNC: 14.4 G/DL (ref 14–18)
IMM GRANULOCYTES # BLD: 0.03 10*3/MM3 (ref 0–0.03)
IMM GRANULOCYTES NFR BLD: 0.6 % (ref 0–5)
LDLC SERPL CALC-MCNC: 103 MG/DL (ref 0–99)
LYMPHOCYTES # BLD AUTO: 1.12 10*3/MM3 (ref 0.72–4.86)
LYMPHOCYTES NFR BLD AUTO: 21.8 % (ref 15–45)
MCH RBC QN AUTO: 28.7 PG (ref 28–32)
MCHC RBC AUTO-ENTMCNC: 30.6 G/DL (ref 33–36)
MCV RBC AUTO: 93.8 FL (ref 82–95)
MONOCYTES # BLD AUTO: 0.4 10*3/MM3 (ref 0.19–1.3)
MONOCYTES NFR BLD AUTO: 7.8 % (ref 4–12)
NEUTROPHILS # BLD AUTO: 3.47 10*3/MM3 (ref 1.87–8.4)
NEUTROPHILS NFR BLD AUTO: 67.7 % (ref 39–78)
NRBC BLD AUTO-RTO: 0 /100 WBC (ref 0–0)
PHENYTOIN SERPL-MCNC: 14.9 MCG/ML (ref 10–20)
PLATELET # BLD AUTO: 199 10*3/MM3 (ref 130–400)
POTASSIUM SERPL-SCNC: 4.4 MMOL/L (ref 3.5–5.3)
PROT SERPL-MCNC: 6.8 G/DL (ref 6.3–8.7)
RBC # BLD AUTO: 5.02 10*6/MM3 (ref 4.8–5.9)
SODIUM SERPL-SCNC: 142 MMOL/L (ref 135–145)
TRIGL SERPL-MCNC: 119 MG/DL (ref 0–149)
VLDLC SERPL CALC-MCNC: 23.8 MG/DL
WBC # BLD AUTO: 5.13 10*3/MM3 (ref 4.8–10.8)

## 2018-07-09 DIAGNOSIS — M79.604 PAIN IN BOTH LOWER EXTREMITIES: ICD-10-CM

## 2018-07-09 DIAGNOSIS — M79.605 PAIN IN BOTH LOWER EXTREMITIES: ICD-10-CM

## 2018-07-10 RX ORDER — DICLOFENAC SODIUM 75 MG/1
TABLET, DELAYED RELEASE ORAL
Qty: 60 TABLET | Refills: 0 | Status: SHIPPED | OUTPATIENT
Start: 2018-07-10 | End: 2018-07-29 | Stop reason: SDUPTHER

## 2018-07-29 DIAGNOSIS — M79.604 PAIN IN BOTH LOWER EXTREMITIES: ICD-10-CM

## 2018-07-29 DIAGNOSIS — M79.605 PAIN IN BOTH LOWER EXTREMITIES: ICD-10-CM

## 2018-07-30 RX ORDER — DICLOFENAC SODIUM 75 MG/1
TABLET, DELAYED RELEASE ORAL
Qty: 180 TABLET | Refills: 0 | Status: SHIPPED | OUTPATIENT
Start: 2018-07-30 | End: 2019-06-14 | Stop reason: SDUPTHER

## 2018-10-25 DIAGNOSIS — M79.605 PAIN IN BOTH LOWER EXTREMITIES: ICD-10-CM

## 2018-10-25 DIAGNOSIS — M79.604 PAIN IN BOTH LOWER EXTREMITIES: ICD-10-CM

## 2018-10-26 RX ORDER — DICLOFENAC SODIUM 75 MG/1
TABLET, DELAYED RELEASE ORAL
Qty: 180 TABLET | Refills: 0 | Status: SHIPPED | OUTPATIENT
Start: 2018-10-26 | End: 2019-01-23 | Stop reason: SDUPTHER

## 2018-11-25 DIAGNOSIS — R56.9 SEIZURE (HCC): ICD-10-CM

## 2018-11-26 RX ORDER — PHENYTOIN SODIUM 100 MG/1
CAPSULE, EXTENDED RELEASE ORAL
Qty: 540 CAPSULE | Refills: 1 | Status: SHIPPED | OUTPATIENT
Start: 2018-11-26 | End: 2019-06-14 | Stop reason: SDUPTHER

## 2018-12-14 ENCOUNTER — OFFICE VISIT (OUTPATIENT)
Dept: FAMILY MEDICINE CLINIC | Facility: CLINIC | Age: 58
End: 2018-12-14

## 2018-12-14 VITALS
DIASTOLIC BLOOD PRESSURE: 98 MMHG | HEART RATE: 86 BPM | OXYGEN SATURATION: 97 % | BODY MASS INDEX: 41.8 KG/M2 | TEMPERATURE: 98.5 F | HEIGHT: 70 IN | SYSTOLIC BLOOD PRESSURE: 154 MMHG | RESPIRATION RATE: 18 BRPM | WEIGHT: 292 LBS

## 2018-12-14 DIAGNOSIS — R56.9 SEIZURE (HCC): Primary | ICD-10-CM

## 2018-12-14 DIAGNOSIS — E66.01 MORBID OBESITY WITH BODY MASS INDEX (BMI) OF 40.0 TO 44.9 IN ADULT (HCC): ICD-10-CM

## 2018-12-14 PROCEDURE — 99213 OFFICE O/P EST LOW 20 MIN: CPT | Performed by: NURSE PRACTITIONER

## 2018-12-14 NOTE — PROGRESS NOTES
CC:  Seizure    Brady Merlos is a 57 yr old male here for follow up for seizures that are controlled with dilantin.  It has been 30 yrs since he has had a seizure.  He was out of medication in November and couldn't get in office so the script was actually sent 11/26/18.  Denies shortness of breath, no chest pain.        Seizures    This is a chronic problem. Episode onset: has had for 30 years. The problem has not changed since onset.Number of times: no seizure activity in 30 yrs. Duration: none in 30 yrs. Pertinent negatives include no sleepiness, no confusion, no headaches, no speech difficulty, no visual disturbance, no neck stiffness, no sore throat, no chest pain, no cough and no nausea. Characteristics do not include eye blinking, eye deviation, bowel incontinence, bladder incontinence, rhythmic jerking, loss of consciousness, bit tongue, apnea or cyanosis. The episode was not witnessed. Possible causes do not include medication or dosage change, sleep deprivation, missed seizure meds, recent illness or change in alcohol use. There has been no fever.        The following portions of the patient's history were reviewed and updated as appropriate: allergies, current medications, past family history, past medical history, past social history, past surgical history and problem list.    Review of Systems   Constitutional: Negative for chills, diaphoresis and fatigue.   HENT: Negative for sore throat.    Eyes: Negative.  Negative for visual disturbance.   Respiratory: Negative for apnea and cough.    Cardiovascular: Negative for chest pain and cyanosis.   Gastrointestinal: Negative for bowel incontinence and nausea.   Genitourinary: Negative for urinary incontinence.   Allergic/Immunologic: Negative.    Neurological: Negative for seizures, loss of consciousness, speech difficulty, numbness, memory problem and confusion.   Psychiatric/Behavioral: Negative for behavioral problems, decreased concentration and  dysphoric mood.   All other systems reviewed and are negative.      Objective   Physical Exam   Constitutional: He is oriented to person, place, and time. Vital signs are normal. He appears well-developed and well-nourished. He is cooperative.   HENT:   Head: Normocephalic and atraumatic.   Right Ear: Hearing, tympanic membrane, external ear and ear canal normal.   Left Ear: Hearing, tympanic membrane, external ear and ear canal normal.   Nose: Nose normal. Right sinus exhibits no maxillary sinus tenderness. Left sinus exhibits no maxillary sinus tenderness.   Mouth/Throat: Uvula is midline and oropharynx is clear and moist.   Cardiovascular: Normal rate, regular rhythm and normal heart sounds.   Pulmonary/Chest: Effort normal and breath sounds normal.   Abdominal: Soft. Normal appearance and bowel sounds are normal.   Lymphadenopathy:        Head (right side): No submental, no submandibular and no tonsillar adenopathy present.        Head (left side): No submental, no submandibular and no tonsillar adenopathy present.   Neurological: He is alert and oriented to person, place, and time. He has normal strength. No cranial nerve deficit or sensory deficit. He displays a negative Romberg sign.   Psychiatric: He has a normal mood and affect. His speech is normal and behavior is normal. Judgment and thought content normal. Cognition and memory are normal.   Nursing note and vitals reviewed.        Assessment/Plan   Brady was seen today for seizures.    Diagnoses and all orders for this visit:    Seizure (CMS/[facility])  -     Phenytoin Level, Total    Morbid obesity with body mass index (BMI) of 40.0 to 44.9 in adult (CMS/[facility])        Return in about 6 months (around 6/14/2019) for Recheck seizures.  Jamee Medina, DNP, APRN    12/14/2018

## 2018-12-15 LAB — PHENYTOIN SERPL-MCNC: 13.3 MCG/ML (ref 10–20)

## 2018-12-27 PROBLEM — H35.413 BILATERAL RETINAL LATTICE DEGENERATION: Status: ACTIVE | Noted: 2018-12-27

## 2018-12-27 PROBLEM — H25.13 NUCLEAR SCLEROSIS, BILATERAL: Status: ACTIVE | Noted: 2018-12-27

## 2019-01-23 DIAGNOSIS — M79.605 PAIN IN BOTH LOWER EXTREMITIES: ICD-10-CM

## 2019-01-23 DIAGNOSIS — M79.604 PAIN IN BOTH LOWER EXTREMITIES: ICD-10-CM

## 2019-01-23 RX ORDER — DICLOFENAC SODIUM 75 MG/1
TABLET, DELAYED RELEASE ORAL
Qty: 180 TABLET | Refills: 0 | Status: SHIPPED | OUTPATIENT
Start: 2019-01-23 | End: 2019-04-23 | Stop reason: SDUPTHER

## 2019-04-23 DIAGNOSIS — M79.605 PAIN IN BOTH LOWER EXTREMITIES: ICD-10-CM

## 2019-04-23 DIAGNOSIS — M79.604 PAIN IN BOTH LOWER EXTREMITIES: ICD-10-CM

## 2019-04-23 RX ORDER — DICLOFENAC SODIUM 75 MG/1
TABLET, DELAYED RELEASE ORAL
Qty: 180 TABLET | Refills: 0 | Status: SHIPPED | OUTPATIENT
Start: 2019-04-23 | End: 2019-06-14 | Stop reason: SDUPTHER

## 2019-05-23 DIAGNOSIS — R56.9 SEIZURE (HCC): ICD-10-CM

## 2019-05-23 RX ORDER — PHENYTOIN SODIUM 100 MG/1
CAPSULE, EXTENDED RELEASE ORAL
Qty: 540 CAPSULE | Refills: 1 | OUTPATIENT
Start: 2019-05-23

## 2019-06-14 ENCOUNTER — OFFICE VISIT (OUTPATIENT)
Dept: FAMILY MEDICINE CLINIC | Facility: CLINIC | Age: 59
End: 2019-06-14

## 2019-06-14 ENCOUNTER — RESULTS ENCOUNTER (OUTPATIENT)
Dept: FAMILY MEDICINE CLINIC | Facility: CLINIC | Age: 59
End: 2019-06-14

## 2019-06-14 VITALS
OXYGEN SATURATION: 98 % | TEMPERATURE: 98.1 F | HEIGHT: 70 IN | DIASTOLIC BLOOD PRESSURE: 76 MMHG | TEMPERATURE: 98.1 F | SYSTOLIC BLOOD PRESSURE: 120 MMHG | RESPIRATION RATE: 18 BRPM | RESPIRATION RATE: 18 BRPM | HEART RATE: 96 BPM | WEIGHT: 287 LBS | HEART RATE: 96 BPM | SYSTOLIC BLOOD PRESSURE: 120 MMHG | HEIGHT: 70 IN | OXYGEN SATURATION: 98 % | BODY MASS INDEX: 41.09 KG/M2 | BODY MASS INDEX: 41.09 KG/M2 | WEIGHT: 287 LBS | DIASTOLIC BLOOD PRESSURE: 76 MMHG

## 2019-06-14 DIAGNOSIS — M79.604 PAIN IN BOTH LOWER EXTREMITIES: ICD-10-CM

## 2019-06-14 DIAGNOSIS — Z12.12 SCREENING FOR COLORECTAL CANCER: ICD-10-CM

## 2019-06-14 DIAGNOSIS — M79.605 PAIN IN BOTH LOWER EXTREMITIES: ICD-10-CM

## 2019-06-14 DIAGNOSIS — Z00.00 ENCOUNTER FOR WELL ADULT EXAM WITHOUT ABNORMAL FINDINGS: Primary | ICD-10-CM

## 2019-06-14 DIAGNOSIS — Z13.220 ENCOUNTER FOR SCREENING FOR LIPID DISORDER: ICD-10-CM

## 2019-06-14 DIAGNOSIS — R56.9 SEIZURE (HCC): ICD-10-CM

## 2019-06-14 DIAGNOSIS — E66.01 CLASS 3 SEVERE OBESITY DUE TO EXCESS CALORIES WITHOUT SERIOUS COMORBIDITY WITH BODY MASS INDEX (BMI) OF 40.0 TO 44.9 IN ADULT (HCC): ICD-10-CM

## 2019-06-14 DIAGNOSIS — Z13.6 ENCOUNTER FOR SCREENING FOR CARDIOVASCULAR DISORDERS: ICD-10-CM

## 2019-06-14 DIAGNOSIS — Z12.11 SCREENING FOR COLORECTAL CANCER: ICD-10-CM

## 2019-06-14 DIAGNOSIS — R56.9 SEIZURES (HCC): Primary | ICD-10-CM

## 2019-06-14 PROCEDURE — 99214 OFFICE O/P EST MOD 30 MIN: CPT | Performed by: NURSE PRACTITIONER

## 2019-06-14 PROCEDURE — 99396 PREV VISIT EST AGE 40-64: CPT | Performed by: NURSE PRACTITIONER

## 2019-06-14 RX ORDER — PHENYTOIN SODIUM 100 MG/1
CAPSULE, EXTENDED RELEASE ORAL
Qty: 540 CAPSULE | Refills: 1 | Status: SHIPPED | OUTPATIENT
Start: 2019-06-14 | End: 2019-11-20 | Stop reason: SDUPTHER

## 2019-06-14 RX ORDER — DICLOFENAC SODIUM 75 MG/1
75 TABLET, DELAYED RELEASE ORAL 2 TIMES DAILY
Qty: 180 TABLET | Refills: 0 | Status: SHIPPED | OUTPATIENT
Start: 2019-06-14 | End: 2019-10-22 | Stop reason: SDUPTHER

## 2019-06-14 NOTE — PROGRESS NOTES
Chief Complaint   Patient presents with   • Annual Exam     Pt here for annual physical        No Known Allergies    HPI:  Brady Merlos is a 58 y.o. male presents today for a well adult .  He has a history of seizures.  Denies any seizures, chest pain and shortness of breath     Past Medical History:   Diagnosis Date   • Hyperlipidemia    • Obesity    • Seizures (CMS/HCC)    • Sleep apnea in adult      Past Surgical History:   Procedure Laterality Date   • SHOULDER SURGERY Left      Social History     Socioeconomic History   • Marital status:      Spouse name: Not on file   • Number of children: Not on file   • Years of education: Not on file   • Highest education level: Not on file   Tobacco Use   • Smoking status: Never Smoker   • Smokeless tobacco: Never Used   Substance and Sexual Activity   • Alcohol use: No   • Drug use: No   • Sexual activity: Defer     Family History   Problem Relation Age of Onset   • Diabetes Mother    • Hypertension Mother    • Diabetes Father    • Cancer Father    • No Known Problems Sister    • No Known Problems Brother    • Cancer Paternal Grandmother    • Heart disease Paternal Grandfather    • No Known Problems Sister    • No Known Problems Brother    • No Known Problems Sister    • No Known Problems Sister        Current Outpatient Medications on File Prior to Visit   Medication Sig Dispense Refill   • diclofenac (VOLTAREN) 75 MG EC tablet TAKE 1 TABLET TWICE A  tablet 0   • DILANTIN 100 MG ER capsule TAKE 3 CAPSULES EVERY      MORNING TAKE 3 CAPSULES    EVERY EVENING 540 capsule 1   • [DISCONTINUED] diclofenac (VOLTAREN) 75 MG EC tablet TAKE 1 TABLET TWICE A  tablet 0     No current facility-administered medications on file prior to visit.         REVIEW OF SYMPTOMS: (Positives bolded)all negative   General:  weight loss, fever, chills, night sweats, fatigue, appetite loss  HEENT:  blurry vision, eye pain, eye discharge, dry eyes, decreased  "vision  Respiratory: shortness of breath, cough, hemoptysis, wheezing, pleurisy,   Cardiovascular:  chest pain, PND, palpitation, edema, orthopnea, syncope, swelling of extremities  Gastro: Nausea, vomiting, diarrhea, hematemesis, abdominal pain, constipation  Genito: hematuria, dysuria, glycosuria, hesitancy, frequency, incontinence  Skin: rash, pruritis, sores, nail changes, skin thickening, change in wart/mole, itching, rash, new lesions, pruritus, nail changes  Neuro:  Migraine, numbness, ataxia, tremor, vertigo, weakness, memory loss, Irritability, dizziness  Endocrine: excessive thirst, polyuria, cold intolerance, heat intolerance, goiter  Psychiatric: depression, anxiety, anti-depressants, alcohol abuse, drug abuse,   \"All other systems reviewed and negative, except as listed above.”    The following portions of the patient's history were reviewed and updated as appropriate: allergies, current medications, past family history, past medical history, past social history, past surgical history and problem list.    OBJECTIVE:  Constitutional:  NAD,  Oriented x 3, alert Posture-Not doubled over, Well developed and well nourished.  Patient is pleasant and cooperative with the interview and exam.    Integumentary: no rashes, ulcers or lesions. No edema.       Head/Neck: normocephalic and atraumatic.  No visible/palpable lumps or pulsations.  Normal cervical ROM. Neck Supple.  Thyroid-No thyromegaly, no nodules    Eye: Bilaterally PERRLA, EOMI.  No discharge.  Upper and lower eyelids are normal. Sclera/conjunctiva normal without discharge. Cornea is normal and clear. Lens is normal.  Eyeball appears normal. No ciliary flushing, no conjunctival injection.    ENMT: Bilateral canals normal without erythema or discharge, no excessive cerumen. TM'S bilateral grey/pearly, normal light reflex and anatomy, No sinus tenderness along frontal/maxillary region, oral mucosa pink and moist. Dentition assessed and discussed " appropriate oral care. Tongue normal midline.  no pharyngeal erythema, Uvula midline. No post nasal drip. No exudate.     CHEST/LUNG: Breath sounds normal throughout all lung fields.  No wheezes, rales, rhonchi.     CARDIOVASCULAR: normal, no bruits or abnormal pulsations. Regular rate and rhythm. No murmur noted in sitting, supine positions. no digital clubbing, cyanosis, edema, increased warmth.    ABDOMEN: normal and no visible pulsations. Normal contour. Bowel sounds normal, no abdominal bruits. Abdomen soft, non-tender, no rebound tenderness, no rigidity (guarding), no jar tenderness, no masses.  No hepatomegaly, no splenomegaly, no hernias.     Musculoskeletal: Generalized-No generalized swelling or edema of extremities, no digital clubbing or cyanosis, neurovascularly intact all four extremities.  Upper extremity- Symmetrical posture.  No visible deformity.  Normal sensation along medial and lateral upper extremity proximally and distally.  NO tenderness overlying shoulder, lateral/medial epicondyle.  5/5 and strength 5/5 bilateral UE.  Elbow palpated, no tenderness overlying olecranon.  Normal supination, pronation to active/passive ROM and to resisted rotation. Bicep insertion/tricep insertion appear normal without obvious pathology. Rotator cuff evaluated and intact.  Normal wrist ROM bilaterally. Normal hand movement, intrinsic muscles of hands normal. No tenderness to palpation of hands/wrists/elbows.  Lower extremity- Not tender to palpation, no pain, no swelling, edema or erythema of surrounding tissue, normal strength and tone.  Normal appearing hip ROM bilaterally without pain.  Knee ROM normal at 0-120 degrees. No tenderness overlying trochanters, no tenderness about patella, quad tendon, patellar tendon.  No tenderness at tibial tuberosity. Ankle normal ROM not tender to palpation along medial/lateral malleolus. Normal movement of toes, no tenderness bilateral feet/toes.  Normal foot type.  "Calves symmetrical.  Stretching demonstrated today.  Spine/Ribs- No deformities, masses or tenderness, no known fractures, normal strength, Normal ROM. Normal stability No tenderness along C/T/L spine.  Normal appearing ROM about spine.      Neuropsych:  Able to articulate well. Normal speech, normal rate, normal tone, normal use of language, volume and coherence.  no SI/HI, no hallucinations, delusions, obsessions.  Memory l intact, remote and recent memory intact.  Age appropriate fund of knowledge, concentration and attention span normal.    Lymphatic: Head/Neck- normal size and non tender to palpation. Axillary- Head and neck LN are normal size and non tender to palpation. Femoral and Inguinal- normal size and non tender to palpation.  /76 (BP Location: Right arm, Patient Position: Sitting, Cuff Size: Adult)   Pulse 96   Temp 98.1 °F (36.7 °C) (Oral)   Resp 18   Ht 177.8 cm (70\")   Wt 130 kg (287 lb)   SpO2 98%   BMI 41.18 kg/m²          ASSESSMENT/PLAN  Brady was seen today for annual exam.    Diagnoses and all orders for this visit:    Encounter for well adult exam without abnormal findings    Pain in both lower extremities    Seizure (CMS/[facility])    Screening for colorectal cancer  -     Cologuard - Stool, Per Rectum; Future    Class 3 severe obesity due to excess calories without serious comorbidity with body mass index (BMI) of 40.0 to 44.9 in adult (CMS/[facility])      Obesity Plan:    The patient BMI is outside this range and we recommended/discussed today to utilize a diet/exercise program to get back into the appropriate range.  Federal guidelines recommend that people under the age of 65 should have a BMI of 18.5-24.9  The initial step is to document everything that is consumed into a food diary. Studies have shown that patients can lose up to 2x the weight by keeping track of foods.   Choose one bad food weekly and eliminate it from your diet.  Replace with one healthy food  Goal over next 2-4 " weeks is walk 30 minutes per day 5 days per week at pace difficult to hold conversation.   Drink more water, less soda.   Cut back on portion sizes.   Today we encouraged roughly a 1 lb per week weight loss with initial goal of 5% weight loss.  Discussed if eating out for a meal, consider cutting food in half and placing into a to go container.  Individually portion any foods coming into the home based on package.  Use smaller plates  Drinking 12 oz of water 30 minutes before meal as way to suppress appetite.  Medications discussed today include metformin, topamax, phentermine, Qsymia, Belviq (lorcaserin), Contrave (Buproprion/naltrexone).    Lifestyle therapy   Simple advice to lose weight   Internet programs or self help books.    Advice from dietitian  Set Goals that are realistic   Encouraged to use visual aides to help in measuring foods  Baseball-1 cup good for green salad, frozen yogurt, medium piece of fruit, baked potato  Handful - ½ cup good cut fruit, cooked vegetables, pasta, rice  Egg- ¼ cup good for dried fruit  Deck of cards-3 ounces good for meat and poultry  Check book-3 ounces good for grilled fish  6 dice side by side- 1 ½ ounces good for natural cheese  Simple tips   Plate method-reduce plate size to 9 inch dinner plate.  Half of plate should be filled with non-starchy vegetables (broccoli, lettuce, cauliflower, tomatoes), ¼ plate with lean source of protein (lean chicken, turkey, fish), remaining ½ with whole grains (brown rice, potato, whole grain breads  Avoid liquid calories (regular soda, juice, coffee with cream)  Focus  on water, seltzer water and other non-calorie drinks  Replace regular sugar with non-caloric sweeteners  Avoid skipping meals: plan small regular meals throughout the day in order to keep your hunger controlled  Consider using meal replacements if unable to plan a healthy meal (protein shake, high protein bar)  Replace all white bread with whole wheat/whole grain  alternative  Swap regular salad dressings (mayonnaise, butter, or low fat or fat free alternative)  Avoid high fat, high calorie, high carbohydrate snakes (cookies, pastries, cakes)  Snack on fruits, low fat dairy (yogurt, cottage cheese)    Behavioral Modification  Self-Monitoring of dietary Intake-keep a dietary intake log. Obese individuals have been shown to underestimate their food intake  Behavioral treatment -gives exacts about amount and total calories  Read food labels          Health:  Exercise daily at least 30 minutes 3x week  Lose weight- decrease calories in diet, eat healthier      Preventive:  Flu vaccine:  Declines  Pneumonia: declines   Zostavax: Declines    Dental check: 2 yrs ago, make appt   Vision exam: November 2018   Colonoscopy: 10 yrs ago, wants to do the cologuard     Patient Counseling:  --Nutrition: Stressed importance of moderation in sodium/caffeine intake, saturated fat and cholesterol, caloric balance, sufficient intake of fresh fruits, vegetables, fiber, calcium, iron,   --Exercise: Stressed the importance of regular exercise.   --Sexuality: Briefly discussed.  Not sexually active. Patient safety discussed.   --Injury prevention: Discussed safety belts, safety helmets, smoke detector, smoking near bedding or upholstery.   --Dental health: Discussed importance of regular tooth brushing, flossing, and dental visits.  --Immunizations reviewed.  --After hours service discussed with patient    Return in about 1 year (around 6/14/2020) for Annual physical.        Jamee Medina, DNP, APRN-BC  06/14/2019

## 2019-06-14 NOTE — PROGRESS NOTES
Chief Complaint   Patient presents with   • Seizures     Follow up        Subjective   Brady Merlos is a 58 y.o. male her for follow up for seizures.  He has not had any seizures for years.  He was diagnosed with the seizure disorder when he was 17.  The dilantin controls it.  Denies any headaches, any neurological symptoms.         The following portions of the patient's history were reviewed and updated as appropriate: allergies, current medications, past family history, past medical history, past social history, past surgical history and problem list.      Current Outpatient Medications:   •  diclofenac (VOLTAREN) 75 MG EC tablet, TAKE 1 TABLET TWICE A DAY, Disp: 180 tablet, Rfl: 0  •  DILANTIN 100 MG ER capsule, TAKE 3 CAPSULES EVERY      MORNING TAKE 3 CAPSULES    EVERY EVENING, Disp: 540 capsule, Rfl: 1  No Known Allergies  Past Medical History:   Diagnosis Date   • Hyperlipidemia    • Obesity    • Seizures (CMS/HCC)    • Sleep apnea in adult      Past Surgical History:   Procedure Laterality Date   • SHOULDER SURGERY Left      Social History     Socioeconomic History   • Marital status:      Spouse name: Not on file   • Number of children: Not on file   • Years of education: Not on file   • Highest education level: Not on file   Tobacco Use   • Smoking status: Never Smoker   • Smokeless tobacco: Never Used   Substance and Sexual Activity   • Alcohol use: No   • Drug use: No   • Sexual activity: Defer     Family History   Problem Relation Age of Onset   • Diabetes Mother    • Hypertension Mother    • Diabetes Father    • Cancer Father    • No Known Problems Sister    • No Known Problems Brother    • Cancer Paternal Grandmother    • Heart disease Paternal Grandfather    • No Known Problems Sister    • No Known Problems Brother    • No Known Problems Sister    • No Known Problems Sister        REVIEW OF SYMPTOMS: (Positives bolded)all negative  General:  weight loss, fever, chills, night sweats,  "fatigue, appetite loss  HEENT:  blurry vision, eye pain, eye discharge, dry eyes, decreased vision  Respiratory: shortness of breath, cough, hemoptysis, wheezing, pleurisy,   Cardiovascular:  chest pain, PND, palpitation, edema, orthopnea, syncope  Gastro: Nausea, vomiting, diarrhea, hematemesis, abdominal pain, constipation  Genito: hematuria, dysuria, glycosuria, hesitancy, frequency, incontinence  Musckelo: Arthralgia, myalgia, muscle weakness, joint swelling, NSAID use  Skin: rash, pruritis, sores, nail changes, skin thickening, change in wart/mole, itching   Neuro:  Migraine, numbness, ataxia, tremor, vertigo, weakness, memory loss  \"All other systems reviewed and negative, except as listed above.”      OBJECTIVE:  Constitutional:  No acute distress, Consistent with stated age. Oriented x 3, alert Posture. Patient is pleasant and cooperative with the interview and exam.    Integumentary: No rashes, ulcers or lesions. No edema.  Normal skin moisture/turgor. Skin is warm to touch, no increased warmth. Capillary refill is normal bilateral Upper and lower extremity.     Eye: Bilaterally PERRLA, EOMI.  No discharge.  Upper and lower eyelids are normal. Sclera/conjunctiva normal without discharge. Cornea is normal and clear. Lens is normal.  Eyeball appears normal. No ciliary flushing, no conjunctival injection.    ENMT:  Canals  normal without erythema or discharge, no excessive cerumen. Tympanic membranes Grey/pearly, normal light reflex and anatomy. Hearing normal to conversational speech at 2-5 feet. Nares airflow, no discharge. Dentition assessed and discussed appropriate oral care. Tongue normal midline.  no pharyngeal erythema, Uvula midline. No post nasal drip.     CHEST/LUNG:Lungs clear throughout lung fields without rale, rhonchi or wheezes.  Normal effort, no distress, no use of accessory muscles. nontender sternum, ribline.  No abnormal pulsations.      CARDIOVASCULAR:  Regular rate and rhythm. No murmur " "noted in sitting, supine positions. digital clubbing, cyanosis, edema, increased warmth.  normal, no bruits or abnormal pulsations.      ABDOMEN: normal and no visible pulsations. Normal contour. Bowel sounds normal, no abdominal bruits. Abd soft, non-tender, no rebound tenderness, no rigidity (guarding), no jar tenderness, no masses.  no hepatomegaly, no splenomegaly     Neuropsych: Able to articulate well. Normal speech, normal rate, normal tone, normal use of language, volume and coherence.  Thought- normal with ability to perform basic computations and apply abstract thought/reason. No hallucinations, delusions, obsessions.   Appropriate judgement and memory.      /76 (BP Location: Right arm, Patient Position: Sitting, Cuff Size: Adult)   Pulse 96   Temp 98.1 °F (36.7 °C) (Oral)   Resp 18   Ht 177.8 cm (70\")   Wt 130 kg (287 lb)   SpO2 98%   BMI 41.18 kg/m²     ASSESSMENT  Brady was seen today for seizures.    Diagnoses and all orders for this visit:    Seizures (CMS/HCC)  -     Phenytoin level, total    Pain in both lower extremities  -     diclofenac (VOLTAREN) 75 MG EC tablet; Take 1 tablet by mouth 2 (Two) Times a Day.    Seizure (CMS/HCC)  -     DILANTIN 100 MG ER capsule; TAKE 3 CAPSULES EVERY      MORNING TAKE 3 CAPSULES    EVERY EVENING      Return in about 6 months (around 12/14/2019) for Recheck dm.    Jamee Medina, DNP, APRN-BC   06/14/2019    "

## 2019-06-14 NOTE — PATIENT INSTRUCTIONS
Non-Epileptic Seizures, Adult  A seizure can cause:  · Involuntary movements, like falling or shaking.  · Changes in awareness or consciousness.  · Convulsions. These are episodes of uncontrollable, jerking movement caused by sudden, intense tightening (contraction) of the muscles.    Epileptic seizures are caused by abnormal electrical activity in the brain. Non-epileptic seizures are different. They are not caused by abnormal electrical signals in your brain. These seizures may look like epileptic seizures, but they are not caused by epilepsy.  There are two types of non-epileptic seizures:  · Physiologic non-epileptic seizure. This type results from an underlying problem that causes a disruption in your brain’s electrical activity.  · Psychogenic non-epileptic seizure. This type results from emotional stress. These seizures are sometimes called pseudoseizures.    What are the causes?  Causes of physiologic non-epileptic seizures can include:  · Sudden drop in blood pressure.  · Low blood sugar (glucose).  · Low levels of salt (sodium) in your blood.  · Low levels of calcium in your blood.  · Migraine.  · Heart rhythm problems.  · Sleep disorders, such as narcolepsy.  · Movement disorders, such as Tourette syndrome.  · Infection.  · Certain medicines.  · Drug and alcohol abuse.  · Fever.    Common causes of psychogenic non-epileptic seizures can include:  · Stress.  · Emotional trauma.  · Sexual or physical abuse.  · Major life events, such as divorce or death of a loved one.  · Mental health disorders, including anxiety and depression.    What are the signs or symptoms?  Symptoms of a non-epileptic seizure can be similar to those of an epileptic seizure, which may include:  · A change in attention or behavior (altered mental status).  · Loss of consciousness or fainting.  · Convulsions with rhythmic jerking movements.  · Drooling.  · Rapid eye movements.  · Grunting.  · Loss of bladder control and bowel  "control.  · Bitter taste in the mouth.  · Tongue biting.    Some people experience unusual sensations (aura) before having a seizure. These can include:  · \"Butterflies\" in the stomach.  · Abnormal smells or tastes.  · A feeling of having had a new experience before (déjà vu).    After a non-epileptic seizure, you may have a headache or sore muscles or feel confused and sleepy. Non-epileptic seizures usually:  · Do not cause physical injuries.  · Start slowly.  · Include crying or shrieking.  · Last longer than 2 minutes.  · Include pelvic thrusting.    How is this diagnosed?  Non-epileptic seizures may be diagnosed by:  · Your medical history.  · A physical exam.  · Your symptoms.  ? Your health care provider may want to talk with your friends or relatives who have seen you have a seizure.  ? If possible, it is helpful if you write down your seizure activity, including what led up to the seizure, and share that information with your health care provider.    You may also need to have tests to look for causes of physiologic non-epileptic seizures. These may include:  · An electroencephalogram (EEG). This test measures electrical activity in your brain. If you have had a non-epileptic seizure, the results of your EEG will likely be normal.  · Video EEG. This test takes place in the hospital over the course of 2-7 days. The test uses a video camera and an EEG to monitor your symptoms and the electrical activity in your brain.  · Blood tests.  · Lumbar puncture. This test involves pulling fluid from your spine to check for infection.  · Electrocardiogram (ECG or EKG). This test checks for an abnormal heart rhythm.  · CT scan.    If your health care provider thinks you have had a psychogenic non-epileptic seizure, you may need to see a mental health specialist for an evaluation.  How is this treated?  The treatment for your seizures will depend on what is causing them. When the underlying condition is treated, your " seizures should stop.  If your seizures are being caused by emotional trauma or stress, your health care provider may recommend that you see a mental health professional. Treatment may include:  · Relaxation therapy or cognitive behavioral therapy.  · Medicines to treat depression or anxiety.  · Individual or family counseling.    In some cases, you may have psychogenic seizures in addition to epileptic seizures. If this is the case, you may be prescribed medicine to help with the epileptic seizures.  Follow these instructions at home:  Home care will depend on the type of non-epileptic seizures that you have. In general:  · Follow all instructions from your health care provider. These may include ways to prevent seizures and what to do if you have a seizure.  · Take over-the-counter and prescription medicines only as told by your health care provider.  · Keep all follow-up visits as told by your health care provider. This is important.  · Make sure family members, friends, and coworkers are trained on how to help you if you have a seizure. If you have a seizure, they should:  ? Lay you on the ground to prevent a fall.  ? Place a pillow or piece of clothing under your head.  ? Loosen any clothing around your neck.  ? Turn you onto your side. If vomiting occurs, this helps keep your airway clear.  · Avoid any substances that may prevent your medicine from working properly. If you are prescribed medicine for seizures:  ? Do not use recreational drugs.  ? Limit or avoid alcoholic beverages.    Contact a health care provider if:  · Your seizures change or become more frequent.  · You continue to have seizures after treatment.  Get help right away if:  · You injure yourself during a seizure.  · You have one seizure after another.  · You have trouble recovering from a seizure.  · You have chest pain or trouble breathing.  · You have a seizure that lasts longer than 5 minutes.  Summary  · Non-epileptic seizures may look  like epileptic seizures, but they are not caused by epilepsy.  · The treatment for your seizures will depend on what is causing them. When the underlying condition is treated, your seizures should stop.  · Make sure family members, friends, and coworkers are trained on how to help you if you have a seizure. If you have a seizure, they should lay you on the ground to prevent a fall, protect your head and neck, and turn you onto your side.  This information is not intended to replace advice given to you by your health care provider. Make sure you discuss any questions you have with your health care provider.  Document Released: 02/02/2007 Document Revised: 09/29/2017 Document Reviewed: 09/29/2017  [employer] Interactive Patient Education © 2019 [employer]

## 2019-06-15 LAB
ALBUMIN SERPL-MCNC: 4.6 G/DL (ref 3.5–5.2)
ALBUMIN/GLOB SERPL: 2.2 G/DL
ALP SERPL-CCNC: 84 U/L (ref 39–117)
ALT SERPL-CCNC: 24 U/L (ref 1–41)
AST SERPL-CCNC: 14 U/L (ref 1–40)
BASOPHILS # BLD AUTO: 0.02 10*3/MM3 (ref 0–0.2)
BASOPHILS NFR BLD AUTO: 0.4 % (ref 0–1.5)
BILIRUB SERPL-MCNC: 0.3 MG/DL (ref 0.2–1.2)
BUN SERPL-MCNC: 14 MG/DL (ref 6–20)
BUN/CREAT SERPL: 16.7 (ref 7–25)
CALCIUM SERPL-MCNC: 9.1 MG/DL (ref 8.6–10.5)
CHLORIDE SERPL-SCNC: 105 MMOL/L (ref 98–107)
CHOLEST SERPL-MCNC: 226 MG/DL (ref 0–200)
CO2 SERPL-SCNC: 28.5 MMOL/L (ref 22–29)
CREAT SERPL-MCNC: 0.84 MG/DL (ref 0.76–1.27)
EOSINOPHIL # BLD AUTO: 0.06 10*3/MM3 (ref 0–0.4)
EOSINOPHIL NFR BLD AUTO: 1.3 % (ref 0.3–6.2)
ERYTHROCYTE [DISTWIDTH] IN BLOOD BY AUTOMATED COUNT: 14.7 % (ref 12.3–15.4)
GLOBULIN SER CALC-MCNC: 2.1 GM/DL
GLUCOSE SERPL-MCNC: 103 MG/DL (ref 65–99)
HCT VFR BLD AUTO: 47 % (ref 37.5–51)
HDLC SERPL-MCNC: 45 MG/DL (ref 40–60)
HGB BLD-MCNC: 14.3 G/DL (ref 13–17.7)
IMM GRANULOCYTES # BLD AUTO: 0.02 10*3/MM3 (ref 0–0.05)
IMM GRANULOCYTES NFR BLD AUTO: 0.4 % (ref 0–0.5)
LDLC SERPL CALC-MCNC: 138 MG/DL (ref 0–100)
LYMPHOCYTES # BLD AUTO: 1.12 10*3/MM3 (ref 0.7–3.1)
LYMPHOCYTES NFR BLD AUTO: 24.5 % (ref 19.6–45.3)
MCH RBC QN AUTO: 29.5 PG (ref 26.6–33)
MCHC RBC AUTO-ENTMCNC: 30.4 G/DL (ref 31.5–35.7)
MCV RBC AUTO: 97.1 FL (ref 79–97)
MONOCYTES # BLD AUTO: 0.43 10*3/MM3 (ref 0.1–0.9)
MONOCYTES NFR BLD AUTO: 9.4 % (ref 5–12)
NEUTROPHILS # BLD AUTO: 2.92 10*3/MM3 (ref 1.7–7)
NEUTROPHILS NFR BLD AUTO: 64 % (ref 42.7–76)
NRBC BLD AUTO-RTO: 0 /100 WBC (ref 0–0.2)
PHENYTOIN SERPL-MCNC: 13.7 MCG/ML (ref 10–20)
PLATELET # BLD AUTO: 188 10*3/MM3 (ref 140–450)
POTASSIUM SERPL-SCNC: 4.5 MMOL/L (ref 3.5–5.2)
PROT SERPL-MCNC: 6.7 G/DL (ref 6–8.5)
RBC # BLD AUTO: 4.84 10*6/MM3 (ref 4.14–5.8)
SODIUM SERPL-SCNC: 145 MMOL/L (ref 136–145)
TRIGL SERPL-MCNC: 214 MG/DL (ref 0–150)
VLDLC SERPL CALC-MCNC: 42.8 MG/DL
WBC # BLD AUTO: 4.57 10*3/MM3 (ref 3.4–10.8)

## 2019-06-17 DIAGNOSIS — E78.2 MIXED HYPERLIPIDEMIA: ICD-10-CM

## 2019-06-17 DIAGNOSIS — R79.89 ABNORMAL CBC: Primary | ICD-10-CM

## 2019-09-17 ENCOUNTER — RESULTS ENCOUNTER (OUTPATIENT)
Dept: FAMILY MEDICINE CLINIC | Facility: CLINIC | Age: 59
End: 2019-09-17

## 2019-09-17 DIAGNOSIS — R79.89 ABNORMAL CBC: ICD-10-CM

## 2019-09-17 DIAGNOSIS — E78.2 MIXED HYPERLIPIDEMIA: ICD-10-CM

## 2019-10-22 DIAGNOSIS — M79.605 PAIN IN BOTH LOWER EXTREMITIES: ICD-10-CM

## 2019-10-22 DIAGNOSIS — M79.604 PAIN IN BOTH LOWER EXTREMITIES: ICD-10-CM

## 2019-10-22 RX ORDER — DICLOFENAC SODIUM 75 MG/1
TABLET, DELAYED RELEASE ORAL
Qty: 180 TABLET | Refills: 0 | Status: SHIPPED | OUTPATIENT
Start: 2019-10-22 | End: 2020-01-09

## 2019-11-20 DIAGNOSIS — R56.9 SEIZURE (HCC): ICD-10-CM

## 2019-11-20 RX ORDER — PHENYTOIN SODIUM 100 MG/1
CAPSULE, EXTENDED RELEASE ORAL
Qty: 540 CAPSULE | Refills: 1 | Status: SHIPPED | OUTPATIENT
Start: 2019-11-20 | End: 2019-12-20 | Stop reason: SDUPTHER

## 2019-12-20 ENCOUNTER — OFFICE VISIT (OUTPATIENT)
Dept: FAMILY MEDICINE CLINIC | Facility: CLINIC | Age: 59
End: 2019-12-20

## 2019-12-20 VITALS
OXYGEN SATURATION: 97 % | RESPIRATION RATE: 18 BRPM | HEART RATE: 74 BPM | DIASTOLIC BLOOD PRESSURE: 80 MMHG | TEMPERATURE: 98.3 F | BODY MASS INDEX: 42 KG/M2 | WEIGHT: 293.4 LBS | SYSTOLIC BLOOD PRESSURE: 150 MMHG | HEIGHT: 70 IN

## 2019-12-20 DIAGNOSIS — G47.33 SLEEP APNEA, OBSTRUCTIVE: Primary | ICD-10-CM

## 2019-12-20 DIAGNOSIS — R56.9 SEIZURE (HCC): ICD-10-CM

## 2019-12-20 DIAGNOSIS — R21 RASH: ICD-10-CM

## 2019-12-20 PROBLEM — H33.329 ROUND HOLE OF RETINA WITHOUT DETACHMENT: Status: ACTIVE | Noted: 2018-12-21

## 2019-12-20 PROBLEM — H35.419 LATTICE DEGENERATION OF RETINA: Status: ACTIVE | Noted: 2018-12-21

## 2019-12-20 PROBLEM — H25.10 NUCLEAR SENILE CATARACT: Status: ACTIVE | Noted: 2018-12-21

## 2019-12-20 PROCEDURE — 99214 OFFICE O/P EST MOD 30 MIN: CPT | Performed by: NURSE PRACTITIONER

## 2019-12-20 PROCEDURE — 96372 THER/PROPH/DIAG INJ SC/IM: CPT | Performed by: NURSE PRACTITIONER

## 2019-12-20 RX ORDER — DEXAMETHASONE SODIUM PHOSPHATE 10 MG/ML
10 INJECTION INTRAMUSCULAR; INTRAVENOUS ONCE
Status: COMPLETED | OUTPATIENT
Start: 2019-12-20 | End: 2019-12-20

## 2019-12-20 RX ORDER — FLUCONAZOLE 100 MG/1
100 TABLET ORAL DAILY
Qty: 3 TABLET | Refills: 0 | Status: SHIPPED | OUTPATIENT
Start: 2019-12-20 | End: 2020-02-10

## 2019-12-20 RX ORDER — CLOTRIMAZOLE 1 %
CREAM (GRAM) TOPICAL 2 TIMES DAILY
Qty: 30 G | Refills: 40 | Status: SHIPPED | OUTPATIENT
Start: 2019-12-20 | End: 2020-12-28

## 2019-12-20 RX ORDER — PHENYTOIN SODIUM 100 MG/1
CAPSULE, EXTENDED RELEASE ORAL
Qty: 540 CAPSULE | Refills: 1 | Status: SHIPPED | OUTPATIENT
Start: 2019-12-20 | End: 2020-08-26

## 2019-12-20 RX ADMIN — DEXAMETHASONE SODIUM PHOSPHATE 10 MG: 10 INJECTION INTRAMUSCULAR; INTRAVENOUS at 09:51

## 2019-12-20 NOTE — PATIENT INSTRUCTIONS
Sleep Apnea  Sleep apnea affects breathing during sleep. It causes breathing to stop for a short time or to become shallow. It can also increase the risk of:  · Heart attack.  · Stroke.  · Being very overweight (obese).  · Diabetes.  · Heart failure.  · Irregular heartbeat.  The goal of treatment is to help you breathe normally again.  What are the causes?  There are three kinds of sleep apnea:  · Obstructive sleep apnea. This is caused by a blocked or collapsed airway.  · Central sleep apnea. This happens when the brain does not send the right signals to the muscles that control breathing.  · Mixed sleep apnea. This is a combination of obstructive and central sleep apnea.  The most common cause of this condition is a collapsed or blocked airway. This can happen if:  · Your throat muscles are too relaxed.  · Your tongue and tonsils are too large.  · You are overweight.  · Your airway is too small.  What increases the risk?  · Being overweight.  · Smoking.  · Having a small airway.  · Being older.  · Being male.  · Drinking alcohol.  · Taking medicines to calm yourself (sedatives or tranquilizers).  · Having family members with the condition.  What are the signs or symptoms?  · Trouble staying asleep.  · Being sleepy or tired during the day.  · Getting angry a lot.  · Loud snoring.  · Headaches in the morning.  · Not being able to focus your mind (concentrate).  · Forgetting things.  · Less interest in sex.  · Mood swings.  · Personality changes.  · Feelings of sadness (depression).  · Waking up a lot during the night to pee (urinate).  · Dry mouth.  · Sore throat.  How is this diagnosed?  · Your medical history.  · A physical exam.  · A test that is done when you are sleeping (sleep study). The test is most often done in a sleep lab but may also be done at home.  How is this treated?    · Sleeping on your side.  · Using a medicine to get rid of mucus in your nose (decongestant).  · Avoiding the use of alcohol,  medicines to help you relax, or certain pain medicines (narcotics).  · Losing weight, if needed.  · Changing your diet.  · Not smoking.  · Using a machine to open your airway while you sleep, such as:  ? An oral appliance. This is a mouthpiece that shifts your lower jaw forward.  ? A CPAP device. This device blows air through a mask when you breathe out (exhale).  ? An EPAP device. This has valves that you put in each nostril.  ? A BPAP device. This device blows air through a mask when you breathe in (inhale) and breathe out.  · Having surgery if other treatments do not work.  It is important to get treatment for sleep apnea. Without treatment, it can lead to:  · High blood pressure.  · Coronary artery disease.  · In men, not being able to have an erection (impotence).  · Reduced thinking ability.  Follow these instructions at home:  Lifestyle  · Make changes that your doctor recommends.  · Eat a healthy diet.  · Lose weight if needed.  · Avoid alcohol, medicines to help you relax, and some pain medicines.  · Do not use any products that contain nicotine or tobacco, such as cigarettes, e-cigarettes, and chewing tobacco. If you need help quitting, ask your doctor.  General instructions  · Take over-the-counter and prescription medicines only as told by your doctor.  · If you were given a machine to use while you sleep, use it only as told by your doctor.  · If you are having surgery, make sure to tell your doctor you have sleep apnea. You may need to bring your device with you.  · Keep all follow-up visits as told by your doctor. This is important.  Contact a doctor if:  · The machine that you were given to use during sleep bothers you or does not seem to be working.  · You do not get better.  · You get worse.  Get help right away if:  · Your chest hurts.  · You have trouble breathing in enough air.  · You have an uncomfortable feeling in your back, arms, or stomach.  · You have trouble talking.  · One side of your  body feels weak.  · A part of your face is hanging down.  These symptoms may be an emergency. Do not wait to see if the symptoms will go away. Get medical help right away. Call your local emergency services (911 in the [place] Do not drive yourself to the hospital.  Summary  · This condition affects breathing during sleep.  · The most common cause is a collapsed or blocked airway.  · The goal of treatment is to help you breathe normally while you sleep.  This information is not intended to replace advice given to you by your health care provider. Make sure you discuss any questions you have with your health care provider.  Document Released: 09/26/2009 Document Revised: 08/13/2019 Document Reviewed: 08/13/2019  [employer] Interactive Patient Education © 2019 [employer]

## 2019-12-20 NOTE — PROGRESS NOTES
Chief Complaint   Patient presents with   • Obesity     Pt is here for followup.    Pt also has complaints of a rash on abdomen.          No Known Allergies    History provided by: self     HPI:  Subjective   Brady Merlos is a 58 y.o. male presents today for follow up for chronic problem of seizures.  He is stable with dilantin and he has not had a seizure in 30 yrs.  Has a rash on his abdomen that will not go away. And grand daughter has ring worm. Says that it has been there for 3 months.He was prescribed steroid nelson, and antibiotic and he did not improve but it did not get worse. Has obstructive sleep apnea and wears a CPAP nightly.  Says he wears it faithfully every night for 8-10 hours and without he could not function     PCP currently listed as Jamee Medina, IVETTE, APRN.     The following portions of the patient's history were reviewed and updated as appropriate: allergies, current medications, past family history, past medical history, past social history, past surgical history and problem list      Current Outpatient Medications:   •  diclofenac (VOLTAREN) 75 MG EC tablet, TAKE 1 TABLET TWICE A DAY, Disp: 180 tablet, Rfl: 0  •  DILANTIN 100 MG ER capsule, TAKE 3 CAPSULES EVERY      MORNING AND TAKE 3 CAPSULESEVERY EVENING, Disp: 540 capsule, Rfl: 1  Past Medical History:   Diagnosis Date   • Hyperlipidemia    • Obesity    • Seizures (CMS/HCC)    • Sleep apnea in adult      Past Surgical History:   Procedure Laterality Date   • SHOULDER SURGERY Left      Social History     Socioeconomic History   • Marital status:      Spouse name: Not on file   • Number of children: Not on file   • Years of education: Not on file   • Highest education level: Not on file   Tobacco Use   • Smoking status: Never Smoker   • Smokeless tobacco: Never Used   Substance and Sexual Activity   • Alcohol use: No   • Drug use: No   • Sexual activity: Defer     Family History   Problem Relation Age of Onset   • Diabetes  "Mother    • Hypertension Mother    • Diabetes Father    • Cancer Father    • No Known Problems Sister    • No Known Problems Brother    • Cancer Paternal Grandmother    • Heart disease Paternal Grandfather    • No Known Problems Sister    • No Known Problems Brother    • No Known Problems Sister    • No Known Problems Sister        REVIEW OF SYMPTOMS: (Positives bolded)  General:  weight loss, fever, chills, night sweats, fatigue, appetite loss  HEENT:  blurry vision, eye pain, eye discharge, dry eyes, decreased vision  Respiratory: shortness of breath, cough, hemoptysis, wheezing, pleurisy,   Cardiovascular:  chest pain, PND, palpitation, edema, orthopnea, syncope  Gastro: Nausea, vomiting, diarrhea, hematemesis, abdominal pain, constipation  Genito: hematuria, dysuria, glycosuria, hesitancy, frequency, incontinence  Musckelo: Arthralgia, myalgia, muscle weakness, joint swelling, NSAID use  Skin: rash, pruritis, sores, nail changes, skin thickening, change in wart/mole, itching   Neuro:  Migraine, numbness, ataxia, tremor, vertigo, weakness, memory loss  \"All other systems reviewed and negative, except as listed above.”    OBJECTIVE:  Constitutional:  No acute distress, Consistent with stated age. Oriented x 3,  Gait normal, in wheelchair. Patient is pleasant and cooperative with the interview and exam.    Integumentary: No rashes, ulcers or lesions. No edema.  Normal skin moisture/turgor. Skin is warm to touch, no increased warmth. Has about 12 red erythematous flat and round lesion on the left side of his abdomen, there are no sores, does not look like ring worn, does not have any plaques, or openings    Eye: Bilaterally PERRLA, EOMI.   Upper and lower eyelids are normal. Sclera/conjunctiva normal without discharge. Cornea is normal and clear. Lens is normal.  Eyeball appears normal.     ENMT:  Canals  normal without erythema or discharge, no excessive cerumen. Tympanic membranes Grey/pearly, normal light reflex " "and anatomy. Hearing normal to conversational speech at 2-5 feet. Nares airflow, no discharge. Dentition assessed and discussed appropriate oral care. Tongue normal midline.  no pharyngeal erythema, Uvula midline. No post nasal drip.     CHEST/LUNG: Lungs clear throughout lung fields without rale, rhonchi or wheezes. no distress, no use of accessory muscles.       CARDIOVASCULAR:  Regular rate and rhythm. No murmur noted in sitting, supine positions.      ABDOMEN:  Bowel sounds normal, no abdominal bruits. Abd soft, non-tender, no rebound tenderness, no rigidity (guarding), no jar tenderness, no masses.  no hepatomegaly, no splenomegaly     Neuropsych: Normal speech, Thought- normal. No hallucinations, delusions, obsessions.   Appropriate judgement and memory.        /80 (BP Location: Right arm, Patient Position: Sitting, Cuff Size: Large Adult)   Pulse 74   Temp 98.3 °F (36.8 °C) (Oral)   Resp 18   Ht 177.8 cm (70\")   Wt 133 kg (293 lb 6.4 oz)   SpO2 97%   BMI 42.10 kg/m²     ASSESSMENT  Brady was seen today for obesity.    Diagnoses and all orders for this visit:    Sleep apnea, obstructive       -      Continue to use CPAP as directed     Seizure (CMS/HCC)  -     Phenytoin level, total  -     DILANTIN 100 MG ER capsule; TAKE 3 CAPSULES EVERY  cl MORNING AND TAKE 3 CAPSULESEVERY EVENING    Rash  -     dexamethasone (DECADRON) injection 10 mg  -     clotrimazole (CLOTRIMAZOLE AF) 1 % cream; Apply  topically to the appropriate area as directed 2 (Two) Times a Day.  -     fluconazole (DIFLUCAN) 100 MG tablet; Take 1 tablet by mouth Daily for 3 days.      Obesity Plan:  BMI 42.1  Weight 29.3 Plan:  Lose 3 pounds before next visit  The patient BMI is outside this range and we recommended/discussed today to utilize a diet/exercise program to get back into the appropriate range.  Federal guidelines recommend that people under the age of 65 should have a BMI of 18.5-24.9  The initial step is to document " everything that is consumed into a food diary. Studies have shown that patients can lose up to 2x the weight by keeping track of foods.   Choose one bad food weekly and eliminate it from your diet.  Replace with one healthy food  Goal over next 2-4 weeks is walk 30 minutes per day 5 days per week at pace difficult to hold conversation.   Drink more water, less soda.   Cut back on portion sizes.   Today we encouraged roughly a 1 lb per week weight loss with initial goal of 5% weight loss.  Discussed if eating out for a meal, consider cutting food in half and placing into a to go container.  Individually portion any foods coming into the home based on package.  Use smaller plates  Drinking 12 oz of water 30 minutes before meal as way to suppress appetite.  Medications discussed today include metformin, topamax, phentermine, Qsymia, Belviq (lorcaserin), Contrave (Buproprion/naltrexone).    Lifestyle therapy   Simple advice to lose weight   Internet programs or self help books.    Advice from dietitian  Set Goals that are realistic   Encouraged to use visual aides to help in measuring foods  Baseball-1 cup good for green salad, frozen yogurt, medium piece of fruit, baked potato  Handful - ½ cup good cut fruit, cooked vegetables, pasta, rice  Egg- ¼ cup good for dried fruit  Deck of cards-3 ounces good for meat and poultry  Check book-3 ounces good for grilled fish  6 dice side by side- 1 ½ ounces good for natural cheese  Simple tips   Plate method-reduce plate size to 9 inch dinner plate.  Half of plate should be filled with non-starchy vegetables (broccoli, lettuce, cauliflower, tomatoes), ¼ plate with lean source of protein (lean chicken, turkey, fish), remaining ½ with whole grains (brown rice, potato, whole grain breads  Avoid liquid calories (regular soda, juice, coffee with cream)  Focus  on water, seltzer water and other non-calorie drinks  Replace regular sugar with non-caloric sweeteners  Avoid skipping meals:  plan small regular meals throughout the day in order to keep your hunger controlled  Consider using meal replacements if unable to plan a healthy meal (protein shake, high protein bar)  Replace all white bread with whole wheat/whole grain alternative  Swap regular salad dressings (mayonnaise, butter, or low fat or fat free alternative)  Avoid high fat, high calorie, high carbohydrate snakes (cookies, pastries, cakes)  Snack on fruits, low fat dairy (yogurt, cottage cheese)    Behavioral Modification  Self-Monitoring of dietary Intake-keep a dietary intake log. Obese individuals have been shown to underestimate their food intake  Behavioral treatment -gives exacts about amount and total calories  Read food labels    R/B/A of medications/treatment options d/w  the pt/family today. The patient/family are aware and accept that medications can have side effects.  If there any obvious side effects they should call or return to clinic as soon as possible.  Appropriate f/u discussed for topics addressed today. All questions were answered to the satisfactory state of patient/family.  Should symptoms fail to improve or worsen they agree to call or return to clinic or to go to the ER. Education handouts were offered on any new Rx if requested.  Discussed the importance of f/u with any needed screening tests/labs/specialist appointments and any requested follow-up recommended by me today.  Importance of maintaining f/u discussed and patient accepts that missed appointments can delay diagnosis and potentially lead to worsening of conditions.    Using medications as prescribed.  Discussed need to take regularly as prescribed, to avoid missing doses as able.  Medications reviewed with patient today. We discussed cessation/continuation of medications for current problem.  Needed Rx refills will be provided.  No side effects from medications.       Return in about 6 months (around 6/20/2020) for Recheck dilantin.    Electronically  signed by Jamee Medina, DNP, APRN, 12/20/19, 9:23 AM.

## 2019-12-21 LAB
BASOPHILS # BLD AUTO: 0.03 10*3/MM3 (ref 0–0.2)
BASOPHILS NFR BLD AUTO: 0.6 % (ref 0–1.5)
CHOLEST SERPL-MCNC: 238 MG/DL (ref 0–200)
EOSINOPHIL # BLD AUTO: 0.09 10*3/MM3 (ref 0–0.4)
EOSINOPHIL NFR BLD AUTO: 1.7 % (ref 0.3–6.2)
ERYTHROCYTE [DISTWIDTH] IN BLOOD BY AUTOMATED COUNT: 14.5 % (ref 12.3–15.4)
HCT VFR BLD AUTO: 43.8 % (ref 37.5–51)
HDLC SERPL-MCNC: 49 MG/DL (ref 40–60)
HGB BLD-MCNC: 14.9 G/DL (ref 13–17.7)
IMM GRANULOCYTES # BLD AUTO: 0.03 10*3/MM3 (ref 0–0.05)
IMM GRANULOCYTES NFR BLD AUTO: 0.6 % (ref 0–0.5)
LDLC SERPL CALC-MCNC: 137 MG/DL (ref 0–100)
LYMPHOCYTES # BLD AUTO: 1.2 10*3/MM3 (ref 0.7–3.1)
LYMPHOCYTES NFR BLD AUTO: 23.2 % (ref 19.6–45.3)
MCH RBC QN AUTO: 29.9 PG (ref 26.6–33)
MCHC RBC AUTO-ENTMCNC: 34 G/DL (ref 31.5–35.7)
MCV RBC AUTO: 87.8 FL (ref 79–97)
MONOCYTES # BLD AUTO: 0.46 10*3/MM3 (ref 0.1–0.9)
MONOCYTES NFR BLD AUTO: 8.9 % (ref 5–12)
NEUTROPHILS # BLD AUTO: 3.37 10*3/MM3 (ref 1.7–7)
NEUTROPHILS NFR BLD AUTO: 65 % (ref 42.7–76)
NRBC BLD AUTO-RTO: 0 /100 WBC (ref 0–0.2)
PHENYTOIN SERPL-MCNC: 15 MCG/ML (ref 10–20)
PLATELET # BLD AUTO: 207 10*3/MM3 (ref 140–450)
RBC # BLD AUTO: 4.99 10*6/MM3 (ref 4.14–5.8)
TRIGL SERPL-MCNC: 261 MG/DL (ref 0–150)
VLDLC SERPL CALC-MCNC: 52.2 MG/DL
WBC # BLD AUTO: 5.18 10*3/MM3 (ref 3.4–10.8)

## 2019-12-23 DIAGNOSIS — E78.2 MIXED HYPERLIPIDEMIA: Primary | ICD-10-CM

## 2019-12-23 RX ORDER — ATORVASTATIN CALCIUM 10 MG/1
20 TABLET, FILM COATED ORAL DAILY
Qty: 90 TABLET | Refills: 0 | Status: SHIPPED | OUTPATIENT
Start: 2019-12-23 | End: 2020-02-10

## 2020-01-09 DIAGNOSIS — M79.604 PAIN IN BOTH LOWER EXTREMITIES: ICD-10-CM

## 2020-01-09 DIAGNOSIS — M79.605 PAIN IN BOTH LOWER EXTREMITIES: ICD-10-CM

## 2020-01-09 RX ORDER — DICLOFENAC SODIUM 75 MG/1
TABLET, DELAYED RELEASE ORAL
Qty: 180 TABLET | Refills: 0 | Status: SHIPPED | OUTPATIENT
Start: 2020-01-09 | End: 2020-04-06

## 2020-02-09 DIAGNOSIS — R21 RASH: ICD-10-CM

## 2020-02-09 DIAGNOSIS — E78.2 MIXED HYPERLIPIDEMIA: ICD-10-CM

## 2020-02-10 RX ORDER — ATORVASTATIN CALCIUM 10 MG/1
TABLET, FILM COATED ORAL
Qty: 90 TABLET | Refills: 0 | Status: SHIPPED | OUTPATIENT
Start: 2020-02-10 | End: 2020-02-17

## 2020-02-10 RX ORDER — FLUCONAZOLE 100 MG/1
TABLET ORAL
Qty: 3 TABLET | Refills: 0 | Status: SHIPPED | OUTPATIENT
Start: 2020-02-10 | End: 2020-06-19

## 2020-02-17 DIAGNOSIS — E78.2 MIXED HYPERLIPIDEMIA: ICD-10-CM

## 2020-02-17 RX ORDER — ATORVASTATIN CALCIUM 10 MG/1
TABLET, FILM COATED ORAL
Qty: 90 TABLET | Refills: 0 | Status: SHIPPED | OUTPATIENT
Start: 2020-02-17 | End: 2020-04-27

## 2020-03-23 ENCOUNTER — RESULTS ENCOUNTER (OUTPATIENT)
Dept: FAMILY MEDICINE CLINIC | Facility: CLINIC | Age: 60
End: 2020-03-23

## 2020-03-23 DIAGNOSIS — E78.2 MIXED HYPERLIPIDEMIA: ICD-10-CM

## 2020-04-06 DIAGNOSIS — M79.605 PAIN IN BOTH LOWER EXTREMITIES: ICD-10-CM

## 2020-04-06 DIAGNOSIS — M79.604 PAIN IN BOTH LOWER EXTREMITIES: ICD-10-CM

## 2020-04-06 RX ORDER — DICLOFENAC SODIUM 75 MG/1
TABLET, DELAYED RELEASE ORAL
Qty: 180 TABLET | Refills: 0 | Status: SHIPPED | OUTPATIENT
Start: 2020-04-06 | End: 2020-07-06

## 2020-04-25 DIAGNOSIS — E78.2 MIXED HYPERLIPIDEMIA: ICD-10-CM

## 2020-04-27 RX ORDER — ATORVASTATIN CALCIUM 10 MG/1
TABLET, FILM COATED ORAL
Qty: 90 TABLET | Refills: 0 | Status: SHIPPED | OUTPATIENT
Start: 2020-04-27 | End: 2020-06-09

## 2020-06-09 DIAGNOSIS — E78.2 MIXED HYPERLIPIDEMIA: ICD-10-CM

## 2020-06-09 RX ORDER — ATORVASTATIN CALCIUM 10 MG/1
TABLET, FILM COATED ORAL
Qty: 180 TABLET | Refills: 0 | Status: SHIPPED | OUTPATIENT
Start: 2020-06-09 | End: 2020-08-26

## 2020-06-19 ENCOUNTER — OFFICE VISIT (OUTPATIENT)
Dept: FAMILY MEDICINE CLINIC | Facility: CLINIC | Age: 60
End: 2020-06-19

## 2020-06-19 VITALS
SYSTOLIC BLOOD PRESSURE: 153 MMHG | HEIGHT: 70 IN | WEIGHT: 285 LBS | DIASTOLIC BLOOD PRESSURE: 85 MMHG | HEART RATE: 68 BPM | TEMPERATURE: 98.1 F | RESPIRATION RATE: 18 BRPM | BODY MASS INDEX: 40.8 KG/M2 | OXYGEN SATURATION: 98 %

## 2020-06-19 VITALS
DIASTOLIC BLOOD PRESSURE: 85 MMHG | TEMPERATURE: 98.1 F | HEIGHT: 70 IN | SYSTOLIC BLOOD PRESSURE: 153 MMHG | BODY MASS INDEX: 40.8 KG/M2 | WEIGHT: 285 LBS | HEART RATE: 68 BPM | OXYGEN SATURATION: 98 % | RESPIRATION RATE: 18 BRPM

## 2020-06-19 DIAGNOSIS — M17.12 ARTHROPATHY OF LEFT KNEE: ICD-10-CM

## 2020-06-19 DIAGNOSIS — Z87.898 HISTORY OF SEIZURE: ICD-10-CM

## 2020-06-19 DIAGNOSIS — E78.2 MIXED HYPERLIPIDEMIA: Primary | ICD-10-CM

## 2020-06-19 DIAGNOSIS — Z51.81 THERAPEUTIC DRUG MONITORING: ICD-10-CM

## 2020-06-19 DIAGNOSIS — G47.30 SLEEP APNEA IN ADULT: ICD-10-CM

## 2020-06-19 DIAGNOSIS — E66.01 CLASS 3 SEVERE OBESITY WITHOUT SERIOUS COMORBIDITY WITH BODY MASS INDEX (BMI) OF 40.0 TO 44.9 IN ADULT, UNSPECIFIED OBESITY TYPE (HCC): ICD-10-CM

## 2020-06-19 DIAGNOSIS — Z00.00 ENCOUNTER FOR ANNUAL PHYSICAL EXAM: Primary | ICD-10-CM

## 2020-06-19 DIAGNOSIS — E78.5 HYPERLIPIDEMIA, UNSPECIFIED HYPERLIPIDEMIA TYPE: ICD-10-CM

## 2020-06-19 PROCEDURE — 99396 PREV VISIT EST AGE 40-64: CPT | Performed by: NURSE PRACTITIONER

## 2020-06-19 PROCEDURE — 99213 OFFICE O/P EST LOW 20 MIN: CPT | Performed by: NURSE PRACTITIONER

## 2020-06-19 NOTE — PROGRESS NOTES
Subjective   Brady Merlos is a 59 y.o. male presents in office for annual physical. No issues today.     History of Present Illness   Here for annual physical. He has no complaints today.     Chronic health issues include hyperlipidemia, arthritis, kavita with cpap use, and seizure disorder.     Today he needs labs. He declines vaccinations. All other HM up to date.       The following portions of the patient's history were reviewed and updated as appropriate: allergies, current medications, past family history, past medical history, past social history, past surgical history and problem list.    Review of Systems   Constitutional: Negative for activity change, appetite change, chills, diaphoresis, fatigue and fever.   Respiratory: Negative for cough and shortness of breath.    Cardiovascular: Negative for chest pain, palpitations and leg swelling.   Gastrointestinal: Negative for abdominal pain, constipation, diarrhea, nausea and vomiting.   Genitourinary: Negative for difficulty urinating.   Musculoskeletal: Positive for arthralgias (knees). Negative for myalgias.   Neurological: Negative for dizziness, syncope, weakness, light-headedness and headaches.   Psychiatric/Behavioral: Negative for dysphoric mood. The patient is not nervous/anxious.        Objective     Vitals:    06/19/20 0747   BP: 153/85   Pulse: 68   Resp: 18   Temp: 98.1 °F (36.7 °C)   SpO2: 98%       Physical Exam   Constitutional: He is oriented to person, place, and time. Vital signs are normal. He appears well-developed and well-nourished. No distress.   HENT:   Head: Normocephalic and atraumatic.   Right Ear: Tympanic membrane, external ear and ear canal normal.   Left Ear: Tympanic membrane, external ear and ear canal normal.   Nose: Nose normal. Right sinus exhibits no maxillary sinus tenderness and no frontal sinus tenderness. Left sinus exhibits no maxillary sinus tenderness and no frontal sinus tenderness.   Mouth/Throat: Uvula is midline  and oropharynx is clear and moist. No uvula swelling.   Eyes: Conjunctivae are normal.   Neck: Neck supple. No tracheal deviation present. No thyromegaly present.   Cardiovascular: Normal rate, regular rhythm and normal heart sounds.   Pulmonary/Chest: Effort normal and breath sounds normal.   Abdominal: Soft. Bowel sounds are normal. He exhibits no abdominal bruit and no mass. There is no hepatosplenomegaly. There is no tenderness.   Musculoskeletal:        Right knee: He exhibits decreased range of motion.        Left knee: He exhibits decreased range of motion and swelling.   Lymphadenopathy:     He has no cervical adenopathy.   Neurological: He is alert and oriented to person, place, and time.   Skin: Skin is warm, dry and intact.   Psychiatric: He has a normal mood and affect. His behavior is normal.   Nursing note and vitals reviewed.         Patient's Body mass index is 40.89 kg/m². BMI is above normal parameters. Recommendations include: exercise counseling and nutrition counseling.       Assessment/Plan   Brady was seen today for annual exam.    Diagnoses and all orders for this visit:    Encounter for annual physical exam    Class 3 severe obesity without serious comorbidity with body mass index (BMI) of 40.0 to 44.9 in adult, unspecified obesity type (CMS/HCC)    Sleep apnea in adult    Arthropathy of left knee    History of seizure    Hyperlipidemia, unspecified hyperlipidemia type      Plan:  Continue current treatment     Counseling/Anticipatory Guidance: Recommend to continue healthy nutrition, physical activity and healthy weight. Discussed injury prevention, dental health, and mental health.  Encouraged recommended immunizations and screenings via .      Return in about 1 year (around 6/19/2021) for Annual physical.             Electronically signed by KAREEM Chandra, 06/19/20, 8:20 AM.

## 2020-06-19 NOTE — PATIENT INSTRUCTIONS
"Keep a record of your blood pressure  Send me a message on [employer] with readings in 2 weeks around July 3.       DASH Eating Plan  DASH stands for \"Dietary Approaches to Stop Hypertension.\" The DASH eating plan is a healthy eating plan that has been shown to reduce high blood pressure (hypertension). It may also reduce your risk for type 2 diabetes, heart disease, and stroke. The DASH eating plan may also help with weight loss.  What are tips for following this plan?    General guidelines  · Avoid eating more than 2,300 mg (milligrams) of salt (sodium) a day. If you have hypertension, you may need to reduce your sodium intake to 1,500 mg a day.  · Limit alcohol intake to no more than 1 drink a day for nonpregnant women and 2 drinks a day for men. One drink equals 12 oz of beer, 5 oz of wine, or 1½ oz of hard liquor.  · Work with your health care provider to maintain a healthy body weight or to lose weight. Ask what an ideal weight is for you.  · Get at least 30 minutes of exercise that causes your heart to beat faster (aerobic exercise) most days of the week. Activities may include walking, swimming, or biking.  · Work with your health care provider or diet and nutrition specialist (dietitian) to adjust your eating plan to your individual calorie needs.  Reading food labels    · Check food labels for the amount of sodium per serving. Choose foods with less than 5 percent of the Daily Value of sodium. Generally, foods with less than 300 mg of sodium per serving fit into this eating plan.  · To find whole grains, look for the word \"whole\" as the first word in the ingredient list.  Shopping  · Buy products labeled as \"low-sodium\" or \"no salt added.\"  · Buy fresh foods. Avoid canned foods and premade or frozen meals.  Cooking  · Avoid adding salt when cooking. Use salt-free seasonings or herbs instead of table salt or sea salt. Check with your health care provider or pharmacist before using salt substitutes.  · Do not " price foods. Cook foods using healthy methods such as baking, boiling, grilling, and broiling instead.  · Cook with heart-healthy oils, such as olive, canola, soybean, or sunflower oil.  Meal planning  · Eat a balanced diet that includes:  ? 5 or more servings of fruits and vegetables each day. At each meal, try to fill half of your plate with fruits and vegetables.  ? Up to 6-8 servings of whole grains each day.  ? Less than 6 oz of lean meat, poultry, or fish each day. A 3-oz serving of meat is about the same size as a deck of cards. One egg equals 1 oz.  ? 2 servings of low-fat dairy each day.  ? A serving of nuts, seeds, or beans 5 times each week.  ? Heart-healthy fats. Healthy fats called Omega-3 fatty acids are found in foods such as flaxseeds and coldwater fish, like sardines, salmon, and mackerel.  · Limit how much you eat of the following:  ? Canned or prepackaged foods.  ? Food that is high in trans fat, such as fried foods.  ? Food that is high in saturated fat, such as fatty meat.  ? Sweets, desserts, sugary drinks, and other foods with added sugar.  ? Full-fat dairy products.  · Do not salt foods before eating.  · Try to eat at least 2 vegetarian meals each week.  · Eat more home-cooked food and less restaurant, buffet, and fast food.  · When eating at a restaurant, ask that your food be prepared with less salt or no salt, if possible.  What foods are recommended?  The items listed may not be a complete list. Talk with your dietitian about what dietary choices are best for you.  Grains  Whole-grain or whole-wheat bread. Whole-grain or whole-wheat pasta. Brown rice. Oatmeal. Quinoa. Bulgur. Whole-grain and low-sodium cereals. Aleta bread. Low-fat, low-sodium crackers. Whole-wheat flour tortillas.  Vegetables  Fresh or frozen vegetables (raw, steamed, roasted, or grilled). Low-sodium or reduced-sodium tomato and vegetable juice. Low-sodium or reduced-sodium tomato sauce and tomato paste. Low-sodium or  reduced-sodium canned vegetables.  Fruits  All fresh, dried, or frozen fruit. Canned fruit in natural juice (without added sugar).  Meat and other protein foods  Skinless chicken or turkey. Ground chicken or turkey. Pork with fat trimmed off. Fish and seafood. Egg whites. Dried beans, peas, or lentils. Unsalted nuts, nut butters, and seeds. Unsalted canned beans. Lean cuts of beef with fat trimmed off. Low-sodium, lean deli meat.  Dairy  Low-fat (1%) or fat-free (skim) milk. Fat-free, low-fat, or reduced-fat cheeses. Nonfat, low-sodium ricotta or cottage cheese. Low-fat or nonfat yogurt. Low-fat, low-sodium cheese.  Fats and oils  Soft margarine without trans fats. Vegetable oil. Low-fat, reduced-fat, or light mayonnaise and salad dressings (reduced-sodium). Canola, safflower, olive, soybean, and sunflower oils. Avocado.  Seasoning and other foods  Herbs. Spices. Seasoning mixes without salt. Unsalted popcorn and pretzels. Fat-free sweets.  What foods are not recommended?  The items listed may not be a complete list. Talk with your dietitian about what dietary choices are best for you.  Grains  Baked goods made with fat, such as croissants, muffins, or some breads. Dry pasta or rice meal packs.  Vegetables  Creamed or fried vegetables. Vegetables in a cheese sauce. Regular canned vegetables (not low-sodium or reduced-sodium). Regular canned tomato sauce and paste (not low-sodium or reduced-sodium). Regular tomato and vegetable juice (not low-sodium or reduced-sodium). Pickles. Olives.  Fruits  Canned fruit in a light or heavy syrup. Fried fruit. Fruit in cream or butter sauce.  Meat and other protein foods  Fatty cuts of meat. Ribs. Fried meat. Shaw. Sausage. Bologna and other processed lunch meats. Salami. Fatback. Hotdogs. Bratwurst. Salted nuts and seeds. Canned beans with added salt. Canned or smoked fish. Whole eggs or egg yolks. Chicken or turkey with skin.  Dairy  Whole or 2% milk, cream, and half-and-half.  Whole or full-fat cream cheese. Whole-fat or sweetened yogurt. Full-fat cheese. Nondairy creamers. Whipped toppings. Processed cheese and cheese spreads.  Fats and oils  Butter. Stick margarine. Lard. Shortening. Ghee. Shaw fat. Tropical oils, such as coconut, palm kernel, or palm oil.  Seasoning and other foods  Salted popcorn and pretzels. Onion salt, garlic salt, seasoned salt, table salt, and sea salt. Worcestershire sauce. Tartar sauce. Barbecue sauce. Teriyaki sauce. Soy sauce, including reduced-sodium. Steak sauce. Canned and packaged gravies. Fish sauce. Oyster sauce. Cocktail sauce. Horseradish that you find on the shelf. Ketchup. Mustard. Meat flavorings and tenderizers. Bouillon cubes. Hot sauce and Tabasco sauce. Premade or packaged marinades. Premade or packaged taco seasonings. Relishes. Regular salad dressings.  Where to find more information:  · National Heart, Lung, and Blood [place]: www.nhlbi.nih.gov  · American Heart Association: www.heart.org  Summary  · The DASH eating plan is a healthy eating plan that has been shown to reduce high blood pressure (hypertension). It may also reduce your risk for type 2 diabetes, heart disease, and stroke.  · With the DASH eating plan, you should limit salt (sodium) intake to 2,300 mg a day. If you have hypertension, you may need to reduce your sodium intake to 1,500 mg a day.  · When on the DASH eating plan, aim to eat more fresh fruits and vegetables, whole grains, lean proteins, low-fat dairy, and heart-healthy fats.  · Work with your health care provider or diet and nutrition specialist (dietitian) to adjust your eating plan to your individual calorie needs.  This information is not intended to replace advice given to you by your health care provider. Make sure you discuss any questions you have with your health care provider.  Document Released: 12/06/2012 Document Revised: 11/30/2018 Document Reviewed: 12/11/2017  Elsevier Patient Education © 2020  Elsevier Inc.

## 2020-06-19 NOTE — PROGRESS NOTES
Subjective   Brady Merlos is a 59 y.o. male presents in office for follow up on chronic issues including seizure history, hyperlipidemia and obesity.     History of Present Illness   Seizure disorder  Chronic. Controlled with dilantin. Last seizures 30 years ago. No new issues. Needs dilantin level today.    Hyperlipidemia  New. On lipitor for 6 months tolerating well. Needs flp.     Obesity  Chronic. Has been trying to lose weight, has lost about 7 lbs.     The following portions of the patient's history were reviewed and updated as appropriate: allergies, current medications, past family history, past medical history, past social history, past surgical history and problem list.    Review of Systems   Constitutional: Negative for activity change, appetite change, chills, diaphoresis, fatigue and fever.   HENT: Negative for congestion, postnasal drip, rhinorrhea, sinus pressure, sinus pain and sore throat.    Respiratory: Negative for cough, chest tightness, shortness of breath and wheezing.    Cardiovascular: Negative for chest pain, palpitations and leg swelling.   Gastrointestinal: Negative for abdominal pain, constipation, diarrhea, nausea and vomiting.   Musculoskeletal: Negative for arthralgias and myalgias.   Skin: Negative for rash.   Neurological: Negative for seizures and headaches.   Psychiatric/Behavioral: Negative for dysphoric mood and sleep disturbance. The patient is not nervous/anxious.        Objective     Vitals:    06/19/20 0750   BP: 153/85   Pulse: 68   Resp: 18   Temp: 98.1 °F (36.7 °C)   SpO2: 98%       Physical Exam   Constitutional: He appears well-developed and well-nourished. No distress.   HENT:   Right Ear: External ear normal.   Left Ear: External ear normal.   Nose: Nose normal.   Mouth/Throat: Oropharynx is clear and moist.   Eyes: Conjunctivae are normal.   Neck: Neck supple. No thyromegaly present.   Cardiovascular: Normal rate, regular rhythm and normal heart sounds.    Pulmonary/Chest: Effort normal and breath sounds normal.   Abdominal: Soft. Bowel sounds are normal. He exhibits distension (obesity). He exhibits no abdominal bruit and no mass. There is no hepatosplenomegaly. There is no tenderness.   Lymphadenopathy:     He has no cervical adenopathy.   Neurological: He is alert.   Skin: Skin is warm and dry.   Psychiatric: He has a normal mood and affect.   Nursing note and vitals reviewed.         Patient's Body mass index is 40.89 kg/m². BMI is above normal parameters. Recommendations include: exercise counseling and nutrition counseling.       Assessment/Plan   Brady was seen today for seizures.    Diagnoses and all orders for this visit:    Mixed hyperlipidemia  -     Comprehensive metabolic panel  -     CBC & Differential  -     Lipid panel    Therapeutic drug monitoring  -     Phenytoin level, total      Plan:  Does not need refills today.   Fasting labs today including dilantin level.     Return in about 6 months (around 12/19/2020) for Recheck chronic.             Electronically signed by KAREEM Chandra, 06/19/20, 8:09 AM.

## 2020-06-19 NOTE — PATIENT INSTRUCTIONS
Obesity, Adult  Obesity is the condition of having too much total body fat. Being overweight or obese means that your weight is greater than what is considered healthy for your body size. Obesity is determined by a measurement called BMI. BMI is an estimate of body fat and is calculated from height and weight. For adults, a BMI of 30 or higher is considered obese.  Obesity can lead to other health concerns and major illnesses, including:  · Stroke.  · Coronary artery disease (CAD).  · Type 2 diabetes.  · Some types of cancer, including cancers of the colon, breast, uterus, and gallbladder.  · Osteoarthritis.  · High blood pressure (hypertension).  · High cholesterol.  · Sleep apnea.  · Gallbladder stones.  · Infertility problems.  What are the causes?  Common causes of this condition include:  · Eating daily meals that are high in calories, sugar, and fat.  · Being born with genes that may make you more likely to become obese.  · Having a medical condition that causes obesity, including:  ? Hypothyroidism.  ? Polycystic ovarian syndrome (PCOS).  ? Binge-eating disorder.  ? Cushing syndrome.  · Taking certain medicines, such as steroids, antidepressants, and seizure medicines.  · Not being physically active (sedentary lifestyle).  · Not getting enough sleep.  · Drinking high amounts of sugar-sweetened beverages, such as soft drinks.  What increases the risk?  The following factors may make you more likely to develop this condition:  · Having a family history of obesity.  · Being a woman of  descent.  · Being a man of  descent.  · Living in an area with limited access to:  ? Friend, recreation centers, or sidewalks.  ? Healthy food choices, such as grocery stores and farmers' markets.  What are the signs or symptoms?  The main sign of this condition is having too much body fat.  How is this diagnosed?  This condition is diagnosed based on:  · Your BMI. If you are an adult with a BMI of 30 or  higher, you are considered obese.  · Your waist circumference. This measures the distance around your waistline.  · Your skinfold thickness. Your health care provider may gently pinch a fold of your skin and measure it.  You may have other tests to check for underlying conditions.  How is this treated?  Treatment for this condition often includes changing your lifestyle. Treatment may include some or all of the following:  · Dietary changes. This may include developing a healthy meal plan.  · Regular physical activity. This may include activity that causes your heart to beat faster (aerobic exercise) and strength training. Work with your health care provider to design an exercise program that works for you.  · Medicine to help you lose weight if you are unable to lose 1 pound a week after 6 weeks of healthy eating and more physical activity.  · Treating conditions that cause the obesity (underlying conditions).  · Surgery. Surgical options may include gastric banding and gastric bypass. Surgery may be done if:  ? Other treatments have not helped to improve your condition.  ? You have a BMI of 40 or higher.  ? You have life-threatening health problems related to obesity.  Follow these instructions at home:  Eating and drinking    · Follow recommendations from your health care provider about what you eat and drink. Your health care provider may advise you to:  ? Limit fast food, sweets, and processed snack foods.  ? Choose low-fat options, such as low-fat milk instead of whole milk.  ? Eat 5 or more servings of fruits or vegetables every day.  ? Eat at home more often. This gives you more control over what you eat.  ? Choose healthy foods when you eat out.  ? Learn to read food labels. This will help you understand how much food is considered 1 serving.  ? Learn what a healthy serving size is.  ? Keep low-fat snacks available.  ? Limit sugary drinks, such as soda, fruit juice, sweetened iced tea, and flavored  milk.  · Drink enough water to keep your urine pale yellow.  · Do not follow a fad diet. Fad diets can be unhealthy and even dangerous.  Physical activity  · Exercise regularly, as told by your health care provider.  ? Most adults should get up to 150 minutes of moderate-intensity exercise every week.  ? Ask your health care provider what types of exercise are safe for you and how often you should exercise.  · Warm up and stretch before being active.  · Cool down and stretch after being active.  · Rest between periods of activity.  Lifestyle  · Work with your health care provider and a dietitian to set a weight-loss goal that is healthy and reasonable for you.  · Limit your screen time.  · Find ways to reward yourself that do not involve food.  · Do not drink alcohol if:  ? Your health care provider tells you not to drink.  ? You are pregnant, may be pregnant, or are planning to become pregnant.  · If you drink alcohol:  ? Limit how much you use to:  § 0-1 drink a day for women.  § 0-2 drinks a day for men.  ? Be aware of how much alcohol is in your drink. In the [place] one drink equals one 12 oz bottle of beer (355 mL), one 5 oz glass of wine (148 mL), or one 1½ oz glass of hard liquor (44 mL).  General instructions  · Keep a weight-loss journal to keep track of the food you eat and how much exercise you get.  · Take over-the-counter and prescription medicines only as told by your health care provider.  · Take vitamins and supplements only as told by your health care provider.  · Consider joining a support group. Your health care provider may be able to recommend a support group.  · Keep all follow-up visits as told by your health care provider. This is important.  Contact a health care provider if:  · You are unable to meet your weight loss goal after 6 weeks of dietary and lifestyle changes.  Get help right away if you are having:  · Trouble breathing.  · Suicidal thoughts or behaviors.  Summary  · Obesity is the  condition of having too much total body fat.  · Being overweight or obese means that your weight is greater than what is considered healthy for your body size.  · Work with your health care provider and a dietitian to set a weight-loss goal that is healthy and reasonable for you.  · Exercise regularly, as told by your health care provider. Ask your health care provider what types of exercise are safe for you and how often you should exercise.  This information is not intended to replace advice given to you by your health care provider. Make sure you discuss any questions you have with your health care provider.  Document Released: 01/25/2006 Document Revised: 08/22/2019 Document Reviewed: 08/22/2019  Elsevier Patient Education © 2020 Elsevier Inc.

## 2020-06-20 LAB
ALBUMIN SERPL-MCNC: 4.7 G/DL (ref 3.5–5.2)
ALBUMIN/GLOB SERPL: 2 G/DL
ALP SERPL-CCNC: 92 U/L (ref 39–117)
ALT SERPL-CCNC: 29 U/L (ref 1–41)
AST SERPL-CCNC: 16 U/L (ref 1–40)
BASOPHILS # BLD AUTO: 0.02 10*3/MM3 (ref 0–0.2)
BASOPHILS NFR BLD AUTO: 0.4 % (ref 0–1.5)
BILIRUB SERPL-MCNC: 0.3 MG/DL (ref 0.2–1.2)
BUN SERPL-MCNC: 12 MG/DL (ref 6–20)
BUN/CREAT SERPL: 16.2 (ref 7–25)
CALCIUM SERPL-MCNC: 9 MG/DL (ref 8.6–10.5)
CHLORIDE SERPL-SCNC: 102 MMOL/L (ref 98–107)
CHOLEST SERPL-MCNC: 174 MG/DL (ref 0–200)
CO2 SERPL-SCNC: 24.8 MMOL/L (ref 22–29)
CREAT SERPL-MCNC: 0.74 MG/DL (ref 0.76–1.27)
EOSINOPHIL # BLD AUTO: 0.08 10*3/MM3 (ref 0–0.4)
EOSINOPHIL NFR BLD AUTO: 1.8 % (ref 0.3–6.2)
ERYTHROCYTE [DISTWIDTH] IN BLOOD BY AUTOMATED COUNT: 14 % (ref 12.3–15.4)
GLOBULIN SER CALC-MCNC: 2.3 GM/DL
GLUCOSE SERPL-MCNC: 107 MG/DL (ref 65–99)
HCT VFR BLD AUTO: 44.4 % (ref 37.5–51)
HDLC SERPL-MCNC: 49 MG/DL (ref 40–60)
HGB BLD-MCNC: 14.6 G/DL (ref 13–17.7)
IMM GRANULOCYTES # BLD AUTO: 0.01 10*3/MM3 (ref 0–0.05)
IMM GRANULOCYTES NFR BLD AUTO: 0.2 % (ref 0–0.5)
LDLC SERPL CALC-MCNC: 93 MG/DL (ref 0–100)
LYMPHOCYTES # BLD AUTO: 1.02 10*3/MM3 (ref 0.7–3.1)
LYMPHOCYTES NFR BLD AUTO: 22.6 % (ref 19.6–45.3)
MCH RBC QN AUTO: 29.7 PG (ref 26.6–33)
MCHC RBC AUTO-ENTMCNC: 32.9 G/DL (ref 31.5–35.7)
MCV RBC AUTO: 90.2 FL (ref 79–97)
MONOCYTES # BLD AUTO: 0.36 10*3/MM3 (ref 0.1–0.9)
MONOCYTES NFR BLD AUTO: 8 % (ref 5–12)
NEUTROPHILS # BLD AUTO: 3.02 10*3/MM3 (ref 1.7–7)
NEUTROPHILS NFR BLD AUTO: 67 % (ref 42.7–76)
NRBC BLD AUTO-RTO: 0 /100 WBC (ref 0–0.2)
PHENYTOIN SERPL-MCNC: 17.7 MCG/ML (ref 10–20)
PLATELET # BLD AUTO: 186 10*3/MM3 (ref 140–450)
POTASSIUM SERPL-SCNC: 4.2 MMOL/L (ref 3.5–5.2)
PROT SERPL-MCNC: 7 G/DL (ref 6–8.5)
RBC # BLD AUTO: 4.92 10*6/MM3 (ref 4.14–5.8)
SODIUM SERPL-SCNC: 140 MMOL/L (ref 136–145)
TRIGL SERPL-MCNC: 162 MG/DL (ref 0–150)
VLDLC SERPL CALC-MCNC: 32.4 MG/DL
WBC # BLD AUTO: 4.51 10*3/MM3 (ref 3.4–10.8)

## 2020-07-02 ENCOUNTER — TELEPHONE (OUTPATIENT)
Dept: FAMILY MEDICINE CLINIC | Facility: CLINIC | Age: 60
End: 2020-07-02

## 2020-07-02 NOTE — TELEPHONE ENCOUNTER
----- Message from Brady Merlos sent at 7/1/2020  5:01 PM CDT -----  Regarding: Non-Urgent Medical Question  Contact: [phone]  Tom,    As requested, here are a few BP readings:  June 20 9:50 AM  130/78    June 22 11:25 AM  140/70    July 1 3:50 PM  142/78    If you need more, please let me know and I will provide them.    Gera

## 2020-07-05 DIAGNOSIS — M79.604 PAIN IN BOTH LOWER EXTREMITIES: ICD-10-CM

## 2020-07-05 DIAGNOSIS — M79.605 PAIN IN BOTH LOWER EXTREMITIES: ICD-10-CM

## 2020-07-06 RX ORDER — DICLOFENAC SODIUM 75 MG/1
TABLET, DELAYED RELEASE ORAL
Qty: 180 TABLET | Refills: 0 | Status: SHIPPED | OUTPATIENT
Start: 2020-07-06 | End: 2020-10-07

## 2020-07-06 NOTE — TELEPHONE ENCOUNTER
Notify patient that blood pressure readings are still borderline. Please complete them for another week and report readings.

## 2020-08-26 DIAGNOSIS — R56.9 SEIZURE (HCC): ICD-10-CM

## 2020-08-26 DIAGNOSIS — E78.2 MIXED HYPERLIPIDEMIA: ICD-10-CM

## 2020-08-26 RX ORDER — PHENYTOIN SODIUM 100 MG/1
CAPSULE, EXTENDED RELEASE ORAL
Qty: 540 CAPSULE | Refills: 1 | Status: SHIPPED | OUTPATIENT
Start: 2020-08-26 | End: 2020-12-29

## 2020-08-26 RX ORDER — ATORVASTATIN CALCIUM 10 MG/1
TABLET, FILM COATED ORAL
Qty: 180 TABLET | Refills: 0 | Status: SHIPPED | OUTPATIENT
Start: 2020-08-26 | End: 2020-12-07

## 2020-10-07 DIAGNOSIS — M79.605 PAIN IN BOTH LOWER EXTREMITIES: ICD-10-CM

## 2020-10-07 DIAGNOSIS — M79.604 PAIN IN BOTH LOWER EXTREMITIES: ICD-10-CM

## 2020-10-07 RX ORDER — DICLOFENAC SODIUM 75 MG/1
TABLET, DELAYED RELEASE ORAL
Qty: 180 TABLET | Refills: 0 | Status: SHIPPED | OUTPATIENT
Start: 2020-10-07 | End: 2020-12-28 | Stop reason: SDUPTHER

## 2020-12-04 DIAGNOSIS — E78.2 MIXED HYPERLIPIDEMIA: ICD-10-CM

## 2020-12-07 RX ORDER — ATORVASTATIN CALCIUM 10 MG/1
TABLET, FILM COATED ORAL
Qty: 180 TABLET | Refills: 0 | Status: SHIPPED | OUTPATIENT
Start: 2020-12-07 | End: 2020-12-28 | Stop reason: SDUPTHER

## 2020-12-28 ENCOUNTER — OFFICE VISIT (OUTPATIENT)
Dept: FAMILY MEDICINE CLINIC | Facility: CLINIC | Age: 60
End: 2020-12-28

## 2020-12-28 VITALS
BODY MASS INDEX: 42 KG/M2 | SYSTOLIC BLOOD PRESSURE: 122 MMHG | OXYGEN SATURATION: 99 % | WEIGHT: 293.4 LBS | TEMPERATURE: 96.6 F | DIASTOLIC BLOOD PRESSURE: 76 MMHG | HEART RATE: 73 BPM | HEIGHT: 70 IN

## 2020-12-28 DIAGNOSIS — E78.5 HYPERLIPIDEMIA, UNSPECIFIED HYPERLIPIDEMIA TYPE: ICD-10-CM

## 2020-12-28 DIAGNOSIS — E66.01 CLASS 3 SEVERE OBESITY DUE TO EXCESS CALORIES WITH SERIOUS COMORBIDITY AND BODY MASS INDEX (BMI) OF 40.0 TO 44.9 IN ADULT (HCC): ICD-10-CM

## 2020-12-28 DIAGNOSIS — M79.605 PAIN IN BOTH LOWER EXTREMITIES: ICD-10-CM

## 2020-12-28 DIAGNOSIS — R56.9 SEIZURE (HCC): Primary | ICD-10-CM

## 2020-12-28 DIAGNOSIS — R53.83 FATIGUE, UNSPECIFIED TYPE: ICD-10-CM

## 2020-12-28 DIAGNOSIS — M79.604 PAIN IN BOTH LOWER EXTREMITIES: ICD-10-CM

## 2020-12-28 PROCEDURE — 99214 OFFICE O/P EST MOD 30 MIN: CPT | Performed by: NURSE PRACTITIONER

## 2020-12-28 RX ORDER — ATORVASTATIN CALCIUM 20 MG/1
20 TABLET, FILM COATED ORAL DAILY
Qty: 90 TABLET | Refills: 1 | Status: SHIPPED | OUTPATIENT
Start: 2020-12-28 | End: 2020-12-28

## 2020-12-28 RX ORDER — DICLOFENAC SODIUM 75 MG/1
75 TABLET, DELAYED RELEASE ORAL 2 TIMES DAILY
Qty: 180 TABLET | Refills: 1 | Status: SHIPPED | OUTPATIENT
Start: 2020-12-28 | End: 2021-06-28

## 2020-12-28 RX ORDER — ATORVASTATIN CALCIUM 20 MG/1
20 TABLET, FILM COATED ORAL DAILY
Qty: 90 TABLET | Refills: 1 | Status: SHIPPED | OUTPATIENT
Start: 2020-12-28 | End: 2021-07-06

## 2020-12-28 NOTE — PATIENT INSTRUCTIONS
Seizure, Adult  A seizure is a sudden burst of abnormal electrical activity in the brain. Seizures usually last from 30 seconds to 2 minutes. They can cause many different symptoms.  Usually, seizures are not harmful unless they last a long time.  What are the causes?  Common causes of this condition include:  · Fever or infection.  · Conditions that affect the brain, such as:  ? A brain abnormality that you were born with.  ? A brain or head injury.  ? Bleeding in the brain.  ? A tumor.  ? Stroke.  ? Brain disorders such as autism or cerebral palsy.  · Low blood sugar.  · Conditions that are passed from parent to child (are inherited).  · Problems with substances, such as:  ? Having a reaction to a drug or a medicine.  ? Suddenly stopping the use of a substance (withdrawal).  In some cases, the cause may not be known. A person who has repeated seizures over time without a clear cause has a condition called epilepsy.  What increases the risk?  You are more likely to get this condition if you have:  · A family history of epilepsy.  · Had a seizure in the past.  · A brain disorder.  · A history of head injury, lack of oxygen at birth, or strokes.  What are the signs or symptoms?  There are many types of seizures. The symptoms vary depending on the type of seizure you have. Examples of symptoms during a seizure include:  · Shaking (convulsions).  · Stiffness in the body.  · Passing out (losing consciousness).  · Head nodding.  · Staring.  · Not responding to sound or touch.  · Loss of bladder control and bowel control.  Some people have symptoms right before and right after a seizure happens.  Symptoms before a seizure may include:  · Fear.  · Worry (anxiety).  · Feeling like you may vomit (nauseous).  · Feeling like the room is spinning (vertigo).  · Feeling like you saw or heard something before (déjà vu).  · Odd tastes or smells.  · Changes in how you see. You may see flashing lights or spots.  Symptoms after a  seizure happens can include:  · Confusion.  · Sleepiness.  · Headache.  · Weakness on one side of the body.  How is this treated?  Most seizures will stop on their own in under 5 minutes. In these cases, no treatment is needed. Seizures that last longer than 5 minutes will usually need treatment. Treatment can include:  · Medicines given through an IV tube.  · Avoiding things that are known to cause your seizures. These can include medicines that you take for another condition.  · Medicines to treat epilepsy.  · Surgery to stop the seizures. This may be needed if medicines do not help.  Follow these instructions at home:  Medicines  · Take over-the-counter and prescription medicines only as told by your doctor.  · Do not eat or drink anything that may keep your medicine from working, such as alcohol.  Activity  · Do not do any activities that would be dangerous if you had another seizure, like driving or swimming. Wait until your doctor says it is safe for you to do them.  · If you live in the [place] ask your local DMV (department of [employer]) when you can drive.  · Get plenty of rest.  Teaching others  Teach friends and family what to do when you have a seizure. They should:  · Lay you on the ground.  · Protect your head and body.  · Loosen any tight clothing around your neck.  · Turn you on your side.  · Not hold you down.  · Not put anything into your mouth.  · Know whether or not you need emergency care.  · Stay with you until you are better.    General instructions  · Contact your doctor each time you have a seizure.  · Avoid anything that gives you seizures.  · Keep a seizure diary. Write down:  ? What you think caused each seizure.  ? What you remember about each seizure.  · Keep all follow-up visits as told by your doctor. This is important.  Contact a doctor if:  · You have another seizure.  · You have seizures more often.  · There is any change in what happens during your seizures.  · You keep having  seizures with treatment.  · You have symptoms of being sick or having an infection.  Get help right away if:  · You have a seizure that:  ? Lasts longer than 5 minutes.  ? Is different than seizures you had before.  ? Makes it harder to breathe.  ? Happens after you hurt your head.  · You have any of these symptoms after a seizure:  ? Not being able to speak.  ? Not being able to use a part of your body.  ? Confusion.  ? A bad headache.  · You have two or more seizures in a row.  · You do not wake up right after a seizure.  · You get hurt during a seizure.  These symptoms may be an emergency. Do not wait to see if the symptoms will go away. Get medical help right away. Call your local emergency services (911 in the [place] Do not drive yourself to the hospital.  Summary  · Seizures usually last from 30 seconds to 2 minutes. Usually, they are not harmful unless they last a long time.  · Do not eat or drink anything that may keep your medicine from working, such as alcohol.  · Teach friends and family what to do when you have a seizure.  · Contact your doctor each time you have a seizure.  This information is not intended to replace advice given to you by your health care provider. Make sure you discuss any questions you have with your health care provider.  Document Revised: 03/06/2020 Document Reviewed: 03/06/2020  Elsevier Patient Education © 2020 [employer] Inc.

## 2020-12-28 NOTE — PROGRESS NOTES
CC: seizures, hyperlipidemia    Brady Merlos is a 60 yr old male here for follow up for seizures.  Says he is well controlled with dilantin.  Hyperlipidemia is controlled with atorvastatin    Seizures   This is a chronic problem. Episode onset: years ago. The problem has not changed since onset.Number of times: has not had seizures in years. Pertinent negatives include no chest pain and no cough. has not had any characteristics There has been no fever.   Hyperlipidemia  This is a chronic problem. The current episode started more than 1 year ago. The problem is controlled. Recent lipid tests were reviewed and are normal. Exacerbating diseases include obesity. He has no history of chronic renal disease, diabetes, hypothyroidism, liver disease or nephrotic syndrome. There are no known factors aggravating his hyperlipidemia. Pertinent negatives include no chest pain or shortness of breath. Current antihyperlipidemic treatment includes statins. The current treatment provides moderate improvement of lipids. There are no compliance problems.  Risk factors for coronary artery disease include obesity and male sex.      Chronic problems: hyperlipidemia stable with atorvastatin, seizures stable with dilantin, and muscle aches and pains stable with diclofenac, obesity     The following portions of the patient's history were reviewed and updated as appropriate: allergies, current medications, past family history, past medical history, past social history, past surgical history and problem list.    Review of Systems   Constitutional: Negative.    Eyes: Negative.    Respiratory: Negative for cough, chest tightness, shortness of breath and wheezing.    Cardiovascular: Negative for chest pain, palpitations and leg swelling.   Allergic/Immunologic: Negative for environmental allergies, food allergies and immunocompromised state.   Neurological: Positive for seizures.   Hematological: Negative.    Psychiatric/Behavioral: Negative for  self-injury, sleep disturbance, suicidal ideas and stress.   All other systems reviewed and are negative.      Objective   Physical Exam  Vitals signs and nursing note reviewed.   Constitutional:       General: He is awake.      Appearance: Normal appearance. He is well-developed and well-groomed. He is obese.   HENT:      Head: Normocephalic and atraumatic.      Right Ear: Hearing and tympanic membrane normal.      Left Ear: Hearing and tympanic membrane normal.      Nose: Nose normal.      Mouth/Throat:      Lips: Pink.      Pharynx: Oropharynx is clear.   Eyes:      Conjunctiva/sclera: Conjunctivae normal.      Pupils: Pupils are equal, round, and reactive to light.   Neck:      Musculoskeletal: Normal range of motion and neck supple.   Cardiovascular:      Rate and Rhythm: Normal rate and regular rhythm.      Pulses: Normal pulses.      Heart sounds: Normal heart sounds.   Pulmonary:      Effort: Pulmonary effort is normal.      Breath sounds: Normal breath sounds.   Abdominal:      General: Abdomen is flat. Bowel sounds are normal.      Palpations: Abdomen is soft.   Musculoskeletal: Normal range of motion.      Right lower leg: No edema.      Left lower leg: No edema.   Lymphadenopathy:      Head:      Right side of head: No submental, submandibular or tonsillar adenopathy.      Left side of head: No submental, submandibular or tonsillar adenopathy.   Skin:     General: Skin is warm and dry.   Neurological:      General: No focal deficit present.      Mental Status: He is alert and oriented to person, place, and time.      Cranial Nerves: Cranial nerves are intact.      Sensory: Sensation is intact.      Motor: Motor function is intact.      Coordination: Coordination is intact.      Gait: Gait is intact.   Psychiatric:         Mood and Affect: Mood normal.         Behavior: Behavior normal. Behavior is cooperative.         Thought Content: Thought content normal.         Judgment: Judgment normal.          Assessment/Plan   Diagnoses and all orders for this visit:    1. Seizure (CMS/[facility]) (Primary)  -     Phenytoin level, total  -     When results in I will reorder the dilantin    2. Hyperlipidemia, unspecified hyperlipidemia type  -     Lipid panel  -     atorvastatin (LIPITOR) 20 MG tablet; Take 1 tablet by mouth Daily.  Dispense: 90 tablet; Refill: 1    3. Fatigue, unspecified type  -     Comprehensive metabolic panel  -     CBC (No Diff)    4. Pain in both lower extremities  -     diclofenac (VOLTAREN) 75 MG EC tablet; Take 1 tablet by mouth 2 (Two) Times a Day.  Dispense: 180 tablet; Refill: 1    5. Class 3 severe obesity due to excess calories with serious comorbidity and body mass index (BMI) of 40.0 to 44.9 in adult (CMS/[facility])      Obesity Plan:  Weight 293 lbs BMI: 42.1   Lose 3 pounds before next visit  The patient BMI is outside this range and we recommended/discussed today to utilize a diet/exercise program to get back into the appropriate range.  Federal guidelines recommend that people under the age of 65 should have a BMI of 18.5-24.9  The initial step is to document everything that is consumed into a food diary. Studies have shown that patients can lose up to 2x the weight by keeping track of foods.   Choose one bad food weekly and eliminate it from your diet.  Replace with one healthy food  Goal over next 2-4 weeks is walk 30 minutes per day 5 days per week at pace difficult to hold conversation.   Drink more water, less soda.   Cut back on portion sizes.   Today we encouraged roughly a 1 lb per week weight loss with initial goal of 5% weight loss.  Discussed if eating out for a meal, consider cutting food in half and placing into a to go container.  Individually portion any foods coming into the home based on package.  Use smaller plates  Drinking 12 oz of water 30 minutes before meal as way to suppress appetite.  Medications discussed today include metformin, topamax, phentermine, Qsymia,  Belviq (lorcaserin), Contrave (Buproprion/naltrexone).    Lifestyle therapy   Simple advice to lose weight   Internet programs or self help books.    Advice from dietitian  Set Goals that are realistic   Encouraged to use visual aides to help in measuring foods  Baseball-1 cup good for green salad, frozen yogurt, medium piece of fruit, baked potato  Handful - ½ cup good cut fruit, cooked vegetables, pasta, rice  Egg- ¼ cup good for dried fruit  Deck of cards-3 ounces good for meat and poultry  Check book-3 ounces good for grilled fish  6 dice side by side- 1 ½ ounces good for natural cheese  Simple tips   Plate method-reduce plate size to 9 inch dinner plate.  Half of plate should be filled with non-starchy vegetables (broccoli, lettuce, cauliflower, tomatoes), ¼ plate with lean source of protein (lean chicken, turkey, fish), remaining ½ with whole grains (brown rice, potato, whole grain breads  Avoid liquid calories (regular soda, juice, coffee with cream)  Focus  on water, seltzer water and other non-calorie drinks  Replace regular sugar with non-caloric sweeteners  Avoid skipping meals: plan small regular meals throughout the day in order to keep your hunger controlled  Consider using meal replacements if unable to plan a healthy meal (protein shake, high protein bar)  Replace all white bread with whole wheat/whole grain alternative  Swap regular salad dressings (mayonnaise, butter, or low fat or fat free alternative)  Avoid high fat, high calorie, high carbohydrate snakes (cookies, pastries, cakes)  Snack on fruits, low fat dairy (yogurt, cottage cheese)            Return in about 6 months (around 6/28/2021) for Recheck seizures and hyperlipidemia.    Electronically signed by Jamee Medina, IVETTE, APRN, 12/28/20, 9:40 AM CST.

## 2020-12-29 DIAGNOSIS — R56.9 SEIZURE (HCC): ICD-10-CM

## 2020-12-29 DIAGNOSIS — R56.9 SEIZURES (HCC): Primary | ICD-10-CM

## 2020-12-29 LAB
ALBUMIN SERPL-MCNC: 4.4 G/DL (ref 3.5–5.2)
ALBUMIN/GLOB SERPL: 1.8 G/DL
ALP SERPL-CCNC: 93 U/L (ref 39–117)
ALT SERPL-CCNC: 28 U/L (ref 1–41)
AST SERPL-CCNC: 16 U/L (ref 1–40)
BILIRUB SERPL-MCNC: 0.3 MG/DL (ref 0–1.2)
BUN SERPL-MCNC: 11 MG/DL (ref 8–23)
BUN/CREAT SERPL: 14.5 (ref 7–25)
CALCIUM SERPL-MCNC: 9.1 MG/DL (ref 8.6–10.5)
CHLORIDE SERPL-SCNC: 105 MMOL/L (ref 98–107)
CHOLEST SERPL-MCNC: 182 MG/DL (ref 0–200)
CO2 SERPL-SCNC: 27.7 MMOL/L (ref 22–29)
CREAT SERPL-MCNC: 0.76 MG/DL (ref 0.76–1.27)
ERYTHROCYTE [DISTWIDTH] IN BLOOD BY AUTOMATED COUNT: 13.9 % (ref 12.3–15.4)
GLOBULIN SER CALC-MCNC: 2.4 GM/DL
GLUCOSE SERPL-MCNC: 104 MG/DL (ref 65–99)
HCT VFR BLD AUTO: 42.2 % (ref 37.5–51)
HDLC SERPL-MCNC: 49 MG/DL (ref 40–60)
HGB BLD-MCNC: 14.1 G/DL (ref 13–17.7)
LDLC SERPL CALC-MCNC: 103 MG/DL (ref 0–100)
MCH RBC QN AUTO: 29.8 PG (ref 26.6–33)
MCHC RBC AUTO-ENTMCNC: 33.4 G/DL (ref 31.5–35.7)
MCV RBC AUTO: 89.2 FL (ref 79–97)
PHENYTOIN SERPL-MCNC: 8 MCG/ML (ref 10–20)
PLATELET # BLD AUTO: 202 10*3/MM3 (ref 140–450)
POTASSIUM SERPL-SCNC: 4.4 MMOL/L (ref 3.5–5.2)
PROT SERPL-MCNC: 6.8 G/DL (ref 6–8.5)
RBC # BLD AUTO: 4.73 10*6/MM3 (ref 4.14–5.8)
SODIUM SERPL-SCNC: 140 MMOL/L (ref 136–145)
TRIGL SERPL-MCNC: 173 MG/DL (ref 0–150)
VLDLC SERPL CALC-MCNC: 30 MG/DL (ref 5–40)
WBC # BLD AUTO: 4.47 10*3/MM3 (ref 3.4–10.8)

## 2020-12-29 RX ORDER — PHENYTOIN SODIUM 100 MG/1
CAPSULE, EXTENDED RELEASE ORAL
Qty: 540 CAPSULE | Refills: 1 | Status: SHIPPED | OUTPATIENT
Start: 2020-12-29 | End: 2021-07-06

## 2021-03-04 DIAGNOSIS — E78.2 MIXED HYPERLIPIDEMIA: ICD-10-CM

## 2021-03-04 RX ORDER — ATORVASTATIN CALCIUM 10 MG/1
TABLET, FILM COATED ORAL
Qty: 180 TABLET | Refills: 0 | OUTPATIENT
Start: 2021-03-04

## 2021-06-28 DIAGNOSIS — M79.604 PAIN IN BOTH LOWER EXTREMITIES: ICD-10-CM

## 2021-06-28 DIAGNOSIS — M79.605 PAIN IN BOTH LOWER EXTREMITIES: ICD-10-CM

## 2021-06-28 RX ORDER — DICLOFENAC SODIUM 75 MG/1
TABLET, DELAYED RELEASE ORAL
Qty: 30 TABLET | Refills: 0 | Status: SHIPPED | OUTPATIENT
Start: 2021-06-28 | End: 2021-08-06

## 2021-07-02 DIAGNOSIS — R56.9 SEIZURE (HCC): ICD-10-CM

## 2021-07-02 DIAGNOSIS — E78.5 HYPERLIPIDEMIA, UNSPECIFIED HYPERLIPIDEMIA TYPE: ICD-10-CM

## 2021-07-02 DIAGNOSIS — R56.9 SEIZURES (HCC): ICD-10-CM

## 2021-07-06 RX ORDER — PHENYTOIN SODIUM 100 MG/1
CAPSULE, EXTENDED RELEASE ORAL
Qty: 540 CAPSULE | Refills: 1 | Status: SHIPPED | OUTPATIENT
Start: 2021-07-06 | End: 2021-07-19 | Stop reason: SDUPTHER

## 2021-07-06 RX ORDER — ATORVASTATIN CALCIUM 20 MG/1
TABLET, FILM COATED ORAL
Qty: 90 TABLET | Refills: 1 | Status: SHIPPED | OUTPATIENT
Start: 2021-07-06 | End: 2021-07-19 | Stop reason: SDUPTHER

## 2021-07-06 NOTE — TELEPHONE ENCOUNTER
Atorvastatin 20 MG tablet  90 tablet  1 refill  Last filled:  12/28/2020    Dilantin 100 MG ER capsule  540 capsule  1 refill  Last filled:  12/29/2020       Last visit:  12/28/2020  Upcoming appt:  07/16/2021

## 2021-07-19 ENCOUNTER — OFFICE VISIT (OUTPATIENT)
Dept: FAMILY MEDICINE CLINIC | Facility: CLINIC | Age: 61
End: 2021-07-19

## 2021-07-19 VITALS
OXYGEN SATURATION: 97 % | HEART RATE: 67 BPM | HEIGHT: 70 IN | RESPIRATION RATE: 18 BRPM | TEMPERATURE: 97.9 F | WEIGHT: 295.5 LBS | DIASTOLIC BLOOD PRESSURE: 83 MMHG | SYSTOLIC BLOOD PRESSURE: 136 MMHG | BODY MASS INDEX: 42.31 KG/M2

## 2021-07-19 VITALS
HEIGHT: 70 IN | WEIGHT: 295.5 LBS | RESPIRATION RATE: 18 BRPM | TEMPERATURE: 97.9 F | DIASTOLIC BLOOD PRESSURE: 83 MMHG | OXYGEN SATURATION: 97 % | BODY MASS INDEX: 42.31 KG/M2 | SYSTOLIC BLOOD PRESSURE: 136 MMHG | HEART RATE: 67 BPM

## 2021-07-19 DIAGNOSIS — Z00.00 WELL ADULT EXAM: Primary | ICD-10-CM

## 2021-07-19 DIAGNOSIS — E78.2 MIXED HYPERLIPIDEMIA: ICD-10-CM

## 2021-07-19 DIAGNOSIS — R56.9 SEIZURE (HCC): Primary | ICD-10-CM

## 2021-07-19 PROCEDURE — 99396 PREV VISIT EST AGE 40-64: CPT | Performed by: NURSE PRACTITIONER

## 2021-07-19 PROCEDURE — 99214 OFFICE O/P EST MOD 30 MIN: CPT | Performed by: NURSE PRACTITIONER

## 2021-07-19 RX ORDER — PHENYTOIN SODIUM 100 MG/1
CAPSULE, EXTENDED RELEASE ORAL
Qty: 540 CAPSULE | Refills: 1 | Status: SHIPPED | OUTPATIENT
Start: 2021-07-19 | End: 2022-01-19 | Stop reason: SDUPTHER

## 2021-07-19 RX ORDER — ATORVASTATIN CALCIUM 20 MG/1
20 TABLET, FILM COATED ORAL DAILY
Qty: 90 TABLET | Refills: 1 | Status: SHIPPED | OUTPATIENT
Start: 2021-07-19 | End: 2022-01-19 | Stop reason: SDUPTHER

## 2021-07-19 NOTE — PROGRESS NOTES
"Chief Complaint  Follow-up (6 month follow up for seizure, hyperlipidemia; pt states all has been good with no problems; ), Seizures, and Hyperlipidemia    Subjective    Brady Merlos presents to [facility]     Seizures. Has not had a seizure in 30 yrs.   Has been doing fine.     Seizures   This is a chronic problem. The current episode started more than 1 week ago. The problem has been gradually improving. Number of times: has not had a seizure in 30 yrs. Pertinent negatives include no sleepiness, no confusion, no headaches, no visual disturbance, no neck stiffness, no chest pain, no cough, no vomiting and no diarrhea. Characteristics do not include eye blinking, eye deviation, bowel incontinence, bladder incontinence, rhythmic jerking, loss of consciousness, bit tongue, apnea or cyanosis. There has been no fever. There were no medications administered prior to arrival.     The following portions of the patient's history were reviewed and updated as appropriate: allergies, current medications, past family history, past medical history, past social history, past surgical history and problem list.    BOLD indicates positive, all negative today   General:  weight loss, fever, chills, appetite loss  SKIN: change in wart/mole, itching, rash, new lesions, nail changes  HEENT:   ear pain, sore throat, sinus pressure, blurry vision, eye pain, dry eyes, tinnitus  Respiratory: cough, difficulty breathing, wheezing, hemoptysis   Cardiovascular:  chest pain, shortness of breath, swelling of extremities, syncope  Gastro: abdominal pain, constipation, nausea, vomiting, diarrhea, hematemesis  Genito: hematuria, dysuria, glycosuria, hesitancy, frequency, incontinence  Musckelo: joint pain, muscle cramps, arthralgia’s, muscle weakness, joint swelling, NSAID use  \"All other systems reviewed and negative, except as listed above.”    Objective   Vital Signs:   /83 (BP Location: Right arm, " "Patient Position: Sitting, Cuff Size: Adult)   Pulse 67   Temp 97.9 °F (36.6 °C) (Temporal)   Resp 18   Ht 177.8 cm (70\")   Wt 134 kg (295 lb 8 oz)   SpO2 97%   BMI 42.40 kg/m²         Physical Exam       Assessment and Plan    Diagnoses and all orders for this visit:    1. Seizure (CMS/HCC) (Primary)  -     CBC (No Diff)  -     Comprehensive metabolic panel  -     Phenytoin level, free  -     Dilantin 100 MG ER capsule; TAKE 3 CAPSULES EVERY      MORNING AND TAKE 3 CAPSULESEVERY EVENING  Dispense: 540 capsule; Refill: 1    2. Mixed hyperlipidemia  -     Lipid panel  -     atorvastatin (LIPITOR) 20 MG tablet; Take 1 tablet by mouth Daily.  Dispense: 90 tablet; Refill: 1      I spent 12 minutes caring for rBady on this date of service. This time includes time spent by me in the following activities:preparing for the visit, performing a medically appropriate examination and/or evaluation , counseling and educating the patient/family/caregiver, ordering medications, tests, or procedures, documenting information in the medical record and care coordination     Follow Up   Return in about 6 months (around 1/19/2022) for Recheck seizures and schedule annual.  Patient was given instructions and counseling regarding his condition or for health maintenance advice. Please see specific information pulled into the AVS if appropriate.       Answers for HPI/ROS submitted by the patient on 7/14/2021  What is the primary reason for your visit?: Physical    Electronically signed by Jamee Medina, IVETTE, APRN, 07/19/21, 12:38 PM CDT.    "

## 2021-07-19 NOTE — PROGRESS NOTES
Chief Complaint   Patient presents with   • Annual Exam       Subjective   History of Present Illness:  Brady Merlos is a 60 y.o. male who presents for a Subsequent Medicare Wellness Visit.      Chronic problems: hyperlipidemia stable with atorvastatin, seizures stable with dilantin.      HEALTH RISK ASSESSMENT    Recent Hospitalizations:  No hospitalization(s) within the last year.    Current Medical Providers:  Patient Care Team:  Jamee Medina DNP, KAREEM as PCP - General  Jamee Medina DNP, KAREEM as PCP - Family Medicine    Smoking Status:  Social History     Tobacco Use   Smoking Status Never Smoker   Smokeless Tobacco Never Used     Alcohol Consumption:  Social History     Substance and Sexual Activity   Alcohol Use No     Depression Screen:   PHQ-2/PHQ-9 Depression Screening 12/28/2020   Little interest or pleasure in doing things 0   Feeling down, depressed, or hopeless 0   Total Score 0     Fall Risk Screen:  TIM Fall Risk Assessment has not been completed. Not at risk for fall    Health Habits and Functional and Cognitive Screening:  No flowsheet data found. No cognitive decline    Does the patient have evidence of cognitive impairment? No    Asprin use counseling:Does not need ASA (and currently is not on it)    Age-appropriate Screening Schedule:  Refer to the list below for future screening recommendations based on patient's age, sex and/or medical conditions. Orders for these recommended tests are listed in the plan section. The patient has been provided with a written plan.    Health Maintenance   Topic Date Due   • INFLUENZA VACCINE  08/01/2021   • LIPID PANEL  12/28/2021   • TDAP/TD VACCINES (2 - Td or Tdap) 06/16/2027   • ZOSTER VACCINE  Discontinued          The following portions of the patient's history were reviewed and updated as appropriate: allergies, current medications, past family history, past medical history, past social history, past surgical history and problem  "list.    Outpatient Medications Prior to Visit   Medication Sig Dispense Refill   • atorvastatin (LIPITOR) 20 MG tablet Take 1 tablet by mouth Daily. 90 tablet 1   • diclofenac (VOLTAREN) 75 MG EC tablet TAKE 1 TABLET TWICE A DAY 30 tablet 0   • Dilantin 100 MG ER capsule TAKE 3 CAPSULES EVERY      MORNING AND TAKE 3 CAPSULESEVERY EVENING 540 capsule 1     No facility-administered medications prior to visit.       Patient Active Problem List   Diagnosis   • Sleep apnea in adult   • Sleep apnea, obstructive   • Nuclear sclerosis, bilateral   • Bilateral retinal lattice degeneration   • Lattice degeneration of retina   • Nuclear senile cataract   • Round hole of retina without detachment   • Arthropathy of left knee   • History of seizure   • Hyperlipidemia       Advanced Care Planning:  ACP discussion was held with the patient during this visit. Patient does not have an advance directive, declines further assistance.    Objective         Vitals:    07/19/21 0927   BP: 136/83   BP Location: Right arm   Patient Position: Sitting   Cuff Size: Adult   Pulse: 67   Resp: 18   Temp: 97.9 °F (36.6 °C)   TempSrc: Temporal   SpO2: 97%   Weight: 134 kg (295 lb 8 oz)   Height: 177.8 cm (70\")   PainSc: 0-No pain       Body mass index is 42.4 kg/m².  Discussed the patient's BMI with him. The BMI is above average; no BMI management plan is appropriate..    Physical Exam          Assessment/Plan   Diagnoses and all orders for this visit:    1. Well adult exam (Primary)          -   Cbc, cmp, lipid, dilantin level in other visit       The above risks/problems have been discussed with the patient.  Pertinent information has been shared with the patient in the After Visit Summary.  Follow up plans and orders are seen below in the Assessment/Plan Section.    There are no diagnoses linked to this encounter.  Follow Up:  Return in about 1 year (around 7/19/2022) for Annual physical.     An After Visit Summary and PPPS were given to the " patient.       Electronically signed by Jamee Medina DNP, APRN, 07/19/21, 9:53 AM CDT.

## 2021-07-19 NOTE — PATIENT INSTRUCTIONS
Seizure, Adult  A seizure is a sudden burst of abnormal electrical activity in the brain. Seizures usually last from 30 seconds to 2 minutes. They can cause many different symptoms.  Usually, seizures are not harmful unless they last a long time.  What are the causes?  Common causes of this condition include:  · Fever or infection.  · Conditions that affect the brain, such as:  ? A brain or head injury.  ? Bleeding in the brain.  ? A tumor.  ? Stroke.  · Low blood sugar.  · Conditions that are passed from parent to child (are inherited).  · Problems with substances, such as:  ? Having a reaction to a drug or a medicine.  ? Suddenly stopping the use of a substance (withdrawal).  · Disorders such as autism or cerebral palsy.  In some cases, the cause may not be known. A person who has repeated seizures over time without a clear cause has a condition called epilepsy.  What increases the risk?  You are more likely to get this condition if you have:  · A family history of epilepsy.  · Had a seizure in the past.  · A history of head injury, lack of oxygen at birth, or strokes.  What are the signs or symptoms?  There are many types of seizures. The symptoms vary depending on the type of seizure you have.  Symptoms during a seizure  · Shaking (convulsions).  · Stiffness in the body.  · Passing out, or losing consciousness.  · Head nodding.  · Staring.  · Not responding to sound or touch.  · Loss of bladder control and bowel control.  Symptoms before a seizure  · Fear.  · Worry (anxiety).  · Feeling like you may vomit.  · Feeling like the room is spinning (vertigo).  · Feeling like you saw or heard something before (déjà vu).  · Odd tastes or smells.  · Changes in how you see. You may see flashing lights or spots.  Symptoms after a seizure  · Confusion.  · Sleepiness.  · Headache.  · Weakness on one side of the body.  How is this treated?  Most seizures will stop on their own in under 5 minutes. In these cases, no treatment  is needed. Seizures that last longer than 5 minutes will usually need treatment. Treatment can include:  · Medicines given through an IV tube.  · Avoiding things that are known to cause your seizures. These can include medicines that you take for another condition.  · Medicines to treat epilepsy.  · Surgery to stop the seizures. This may be needed if medicines do not help.  Follow these instructions at home:  Medicines  · Take over-the-counter and prescription medicines only as told by your doctor.  · Do not eat or drink anything that may keep your medicine from working, such as alcohol.  Activity  · Do not do any activities that would be dangerous if you had another seizure, like driving or swimming. Wait until your doctor says it is safe for you to do them.  · If you live in the [place] ask your local DMV when you can drive.  · Get plenty of rest. Lack of sleep can make seizures more likely to occur.  Teaching others  Teach friends and family what to do when you have a seizure. They should:  · Lay you on the ground.  · Protect your head and body.  · Loosen any tight clothing around your neck.  · Turn you on your side.  · Not hold you down.  · Not put anything into your mouth.  · Know whether or not you need emergency care. For example, they should get help right away if:  ? You have a seizure that lasts longer than 5 minutes.  ? You have several seizures that follow one another.  · Stay with you until you are better.    General instructions  · Contact your doctor each time you have a seizure.  · Avoid anything that gives you seizures.  · Keep a seizure diary. Write down:  ? What you think caused each seizure.  ? What you remember about each seizure.  · Keep all follow-up visits as told by your doctor. This is important.  Contact a doctor if:  · You have another seizure.  · You have seizures more often.  · There is any change in what happens during your seizures.  · You keep having seizures with treatment.  · You  have symptoms of being sick or having an infection. This may make a seizure more likely to happen.  Get help right away if:  · You have a seizure that:  ? Lasts longer than 5 minutes.  ? Is different than seizures you had before.  ? Makes it harder to breathe.  ? Happens after you hurt your head.  · You have any of these symptoms after a seizure:  ? Not being able to speak.  ? Not being able to use a part of your body.  ? Confusion.  ? A bad headache.  · You have two or more seizures in a row.  · You do not wake up right after a seizure.  · You get hurt during a seizure.  These symptoms may be an emergency. Do not wait to see if the symptoms will go away. Get medical help right away. Call your local emergency services (911 in the [place] Do not drive yourself to the hospital.  Summary  · A seizure is a sudden burst of abnormal electrical activity in the brain. Seizures usually last from 30 seconds to 2 minutes. They are not harmful unless they last a long time.  · Causes of seizures include illnesses, medicines, conditions that are passed from parent to child, injury to your head, strokes, drug abuse, or growths or tumors.  · Do not eat or drink anything that may keep your medicine from working, such as alcohol.  · Teach friends and family what to do when you have a seizure.  · Contact your doctor each time you have a seizure.  This information is not intended to replace advice given to you by your health care provider. Make sure you discuss any questions you have with your health care provider.  Document Revised: 11/13/2020 Document Reviewed: 11/13/2020  Elsevier Patient Education © 2021 [employer]

## 2021-07-20 DIAGNOSIS — R73.01 IMPAIRED FASTING BLOOD SUGAR: ICD-10-CM

## 2021-07-20 DIAGNOSIS — E78.2 MIXED HYPERLIPIDEMIA: ICD-10-CM

## 2021-07-20 DIAGNOSIS — R56.9 SEIZURE (HCC): Primary | ICD-10-CM

## 2021-07-20 LAB
ALBUMIN SERPL-MCNC: 4.6 G/DL (ref 3.5–5.2)
ALBUMIN/GLOB SERPL: 2.2 G/DL
ALP SERPL-CCNC: 100 U/L (ref 39–117)
ALT SERPL-CCNC: 30 U/L (ref 1–41)
AST SERPL-CCNC: 21 U/L (ref 1–40)
BILIRUB SERPL-MCNC: 0.3 MG/DL (ref 0–1.2)
BUN SERPL-MCNC: 13 MG/DL (ref 8–23)
BUN/CREAT SERPL: 20.6 (ref 7–25)
CALCIUM SERPL-MCNC: 9.3 MG/DL (ref 8.6–10.5)
CHLORIDE SERPL-SCNC: 106 MMOL/L (ref 98–107)
CHOLEST SERPL-MCNC: 187 MG/DL (ref 0–200)
CO2 SERPL-SCNC: 27.8 MMOL/L (ref 22–29)
CREAT SERPL-MCNC: 0.63 MG/DL (ref 0.76–1.27)
ERYTHROCYTE [DISTWIDTH] IN BLOOD BY AUTOMATED COUNT: 13.4 % (ref 12.3–15.4)
GLOBULIN SER CALC-MCNC: 2.1 GM/DL
GLUCOSE SERPL-MCNC: 106 MG/DL (ref 65–99)
HCT VFR BLD AUTO: 45.2 % (ref 37.5–51)
HDLC SERPL-MCNC: 44 MG/DL (ref 40–60)
HGB BLD-MCNC: 14.7 G/DL (ref 13–17.7)
LDLC SERPL CALC-MCNC: 108 MG/DL (ref 0–100)
MCH RBC QN AUTO: 29.2 PG (ref 26.6–33)
MCHC RBC AUTO-ENTMCNC: 32.5 G/DL (ref 31.5–35.7)
MCV RBC AUTO: 89.7 FL (ref 79–97)
PHENYTOIN FREE SERPL-MCNC: 0.7 UG/ML (ref 1–2)
PLATELET # BLD AUTO: 192 10*3/MM3 (ref 140–450)
POTASSIUM SERPL-SCNC: 4.8 MMOL/L (ref 3.5–5.2)
PROT SERPL-MCNC: 6.7 G/DL (ref 6–8.5)
RBC # BLD AUTO: 5.04 10*6/MM3 (ref 4.14–5.8)
SODIUM SERPL-SCNC: 141 MMOL/L (ref 136–145)
TRIGL SERPL-MCNC: 200 MG/DL (ref 0–150)
VLDLC SERPL CALC-MCNC: 35 MG/DL (ref 5–40)
WBC # BLD AUTO: 4.44 10*3/MM3 (ref 3.4–10.8)

## 2021-07-22 LAB
HBA1C MFR BLD: 5.8 % (ref 4.8–5.6)
PHENYTOIN SERPL-MCNC: 13.5 MCG/ML (ref 10–20)

## 2021-08-05 DIAGNOSIS — M79.605 PAIN IN BOTH LOWER EXTREMITIES: ICD-10-CM

## 2021-08-05 DIAGNOSIS — M79.604 PAIN IN BOTH LOWER EXTREMITIES: ICD-10-CM

## 2021-08-06 RX ORDER — DICLOFENAC SODIUM 75 MG/1
TABLET, DELAYED RELEASE ORAL
Qty: 30 TABLET | Refills: 0 | Status: SHIPPED | OUTPATIENT
Start: 2021-08-06 | End: 2021-09-02

## 2021-09-01 DIAGNOSIS — M79.605 PAIN IN BOTH LOWER EXTREMITIES: ICD-10-CM

## 2021-09-01 DIAGNOSIS — M79.604 PAIN IN BOTH LOWER EXTREMITIES: ICD-10-CM

## 2021-09-02 RX ORDER — DICLOFENAC SODIUM 75 MG/1
TABLET, DELAYED RELEASE ORAL
Qty: 30 TABLET | Refills: 0 | Status: SHIPPED | OUTPATIENT
Start: 2021-09-02 | End: 2021-09-27

## 2021-09-25 DIAGNOSIS — M79.605 PAIN IN BOTH LOWER EXTREMITIES: ICD-10-CM

## 2021-09-25 DIAGNOSIS — M79.604 PAIN IN BOTH LOWER EXTREMITIES: ICD-10-CM

## 2021-09-27 RX ORDER — DICLOFENAC SODIUM 75 MG/1
TABLET, DELAYED RELEASE ORAL
Qty: 180 TABLET | Refills: 1 | Status: SHIPPED | OUTPATIENT
Start: 2021-09-27 | End: 2022-03-09

## 2021-09-27 NOTE — TELEPHONE ENCOUNTER
.  Rx Refill Note  Requested Prescriptions     Pending Prescriptions Disp Refills   • diclofenac (VOLTAREN) 75 MG EC tablet [Pharmacy Med Name: DICLOFEN SOD TAB 75MG EC] 30 tablet 0     Sig: TAKE 1 TABLET TWICE A DAY      Last office visit with prescribing clinician: 7/19/2020      Next office visit with prescribing clinician: 01/19/2022           Svetlana Santana LPN  09/27/21, 08:26 CDT

## 2022-01-19 ENCOUNTER — OFFICE VISIT (OUTPATIENT)
Dept: FAMILY MEDICINE CLINIC | Facility: CLINIC | Age: 62
End: 2022-01-19

## 2022-01-19 VITALS
OXYGEN SATURATION: 98 % | HEIGHT: 70 IN | SYSTOLIC BLOOD PRESSURE: 152 MMHG | BODY MASS INDEX: 42.83 KG/M2 | TEMPERATURE: 98.2 F | DIASTOLIC BLOOD PRESSURE: 88 MMHG | HEART RATE: 83 BPM | WEIGHT: 299.2 LBS | RESPIRATION RATE: 20 BRPM

## 2022-01-19 DIAGNOSIS — R56.9 SEIZURE: Primary | ICD-10-CM

## 2022-01-19 DIAGNOSIS — R73.01 IMPAIRED FASTING GLUCOSE: ICD-10-CM

## 2022-01-19 DIAGNOSIS — G47.33 SLEEP APNEA, OBSTRUCTIVE: ICD-10-CM

## 2022-01-19 DIAGNOSIS — E78.2 MIXED HYPERLIPIDEMIA: ICD-10-CM

## 2022-01-19 DIAGNOSIS — D17.23 LIPOMA OF RIGHT LOWER EXTREMITY: ICD-10-CM

## 2022-01-19 PROCEDURE — 99214 OFFICE O/P EST MOD 30 MIN: CPT | Performed by: NURSE PRACTITIONER

## 2022-01-19 RX ORDER — PHENYTOIN SODIUM 100 MG/1
CAPSULE, EXTENDED RELEASE ORAL
Qty: 540 CAPSULE | Refills: 1 | Status: SHIPPED | OUTPATIENT
Start: 2022-01-19 | End: 2022-03-25

## 2022-01-19 RX ORDER — ATORVASTATIN CALCIUM 20 MG/1
20 TABLET, FILM COATED ORAL DAILY
Qty: 90 TABLET | Refills: 1 | Status: SHIPPED | OUTPATIENT
Start: 2022-01-19 | End: 2022-07-18 | Stop reason: SDUPTHER

## 2022-01-19 NOTE — PROGRESS NOTES
"  Chief Complaint  Seizures (Follow up)    Subjective          Brady Merlos presents to [facility]  Hyperlipidemia and seizures.  Denies any seizure activity (has not had seizure in 29 years).  Denies any chest pain, shortness of breath, or palpitations. Has sleep apnea CPAP and wears it 8-10 hours a night and his sleep patterns are improved.  States, \"I have a cyst on my right ankle for many years and I never worried about it but now it is getting bigger and starting to be tender and would like to get it removed and wants referral to Dr. Sellers.     Seizures   This is a chronic problem. The current episode started more than 1 week ago. The problem has not changed since onset.There were 4 to 5 seizures. Pertinent negatives include no sleepiness, no confusion, no headaches, no speech difficulty, no visual disturbance, no neck stiffness, no sore throat, no chest pain and no diarrhea. Characteristics do not include eye blinking, eye deviation, bladder incontinence, rhythmic jerking, bit tongue or apnea. The seizure(s) had no focality. There has been no fever. There were no medications administered prior to arrival.   Insomnia  This is a chronic problem. The current episode started more than 1 year ago. The problem occurs intermittently. The problem has been gradually improving. Pertinent negatives include no arthralgias, change in bowel habit, chest pain, headaches, joint swelling, myalgias, sore throat, urinary symptoms, vertigo or visual change. Nothing aggravates the symptoms. He has tried nothing for the symptoms. The treatment provided no relief.   Cyst  This is a chronic problem. The current episode started more than 1 year ago. The problem occurs intermittently. The problem has been gradually worsening. Pertinent negatives include no arthralgias, change in bowel habit, chest pain, headaches, joint swelling, myalgias, sore throat, urinary symptoms, vertigo or visual change. The " "symptoms are aggravated by bending, walking and standing. He has tried acetaminophen for the symptoms. The treatment provided mild relief.     The following portions of the patient's history were reviewed and updated as appropriate: allergies, current medications, past family history, past medical history, past social history, past surgical history and problem list.      Objective   Vital Signs:   /88 (BP Location: Left arm, Patient Position: Sitting, Cuff Size: Adult)   Pulse 83   Temp 98.2 °F (36.8 °C) (Infrared)   Resp 20   Ht 177.8 cm (70\")   Wt 136 kg (299 lb 3.2 oz)   SpO2 98%   BMI 42.93 kg/m²     Physical Exam  Vitals and nursing note reviewed.   Constitutional:       General: He is awake.      Appearance: Normal appearance. He is well-developed and well-groomed.   HENT:      Head: Normocephalic and atraumatic.      Right Ear: Hearing, tympanic membrane, ear canal and external ear normal.      Left Ear: Hearing, tympanic membrane, ear canal and external ear normal.      Nose: Nose normal.      Mouth/Throat:      Lips: Pink.      Pharynx: Oropharynx is clear.   Eyes:      General: Lids are normal.      Conjunctiva/sclera: Conjunctivae normal.   Cardiovascular:      Rate and Rhythm: Normal rate and regular rhythm.      Heart sounds: Normal heart sounds.   Pulmonary:      Effort: Pulmonary effort is normal.      Breath sounds: Normal breath sounds and air entry.   Musculoskeletal:      Cervical back: Full passive range of motion without pain.      Right lower leg: No edema.      Left lower leg: No edema.   Lymphadenopathy:      Head:      Right side of head: No submental, submandibular or tonsillar adenopathy.      Left side of head: No submental, submandibular or tonsillar adenopathy.   Skin:     General: Skin is warm and dry.      Findings: No abrasion, abscess, acne, bruising, burn, ecchymosis or signs of injury.      Nails: There is no clubbing.          Neurological:      Mental Status: He is " alert.      Sensory: Sensation is intact.      Motor: Motor function is intact.      Coordination: Coordination is intact.      Gait: Gait is intact.   Psychiatric:         Attention and Perception: Attention and perception normal.         Mood and Affect: Mood and affect normal.         Speech: Speech normal.         Behavior: Behavior normal. Behavior is cooperative.         Thought Content: Thought content normal.         Cognition and Memory: Cognition and memory normal.         Judgment: Judgment normal.        Result Review :                 Assessment and Plan    Diagnoses and all orders for this visit:    1. Seizure (HCC) (Primary)  -     Phenytoin level, total  -     CBC (No Diff)  -     Comprehensive metabolic panel  -     Dilantin 100 MG capsule; TAKE 3 CAPSULES EVERY      MORNING AND TAKE 3 CAPSULESEVERY EVENING  Dispense: 540 capsule; Refill: 1    2. Sleep apnea, obstructive  -     CBC (No Diff)  -     Comprehensive metabolic panel    3. Mixed hyperlipidemia  -     CBC (No Diff)  -     Comprehensive metabolic panel  -     atorvastatin (LIPITOR) 20 MG tablet; Take 1 tablet by mouth Daily.  Dispense: 90 tablet; Refill: 1    4. Impaired fasting glucose  -     Hemoglobin A1c    5. Lipoma of right lower extremity  -     Ambulatory Referral to General Surgery:  Dr. Sellers  Feb 9, 2022 11:30; pt was given information and time    I spent 13 minutes caring for Brady on this date of service. This time includes time spent by me in the following activities:preparing for the visit, performing a medically appropriate examination and/or evaluation , counseling and educating the patient/family/caregiver, ordering medications, tests, or procedures, documenting information in the medical record and care coordination     Follow Up     Return in about 6 months (around 7/19/2022).     Patient was given instructions and counseling regarding his condition or for health maintenance advice. Please see specific information  pulled into the AVS if appropriate.       Electronically signed by Jamee Medina DNP, APRN, 01/19/22, 8:27 AM CST.

## 2022-01-20 LAB
ALBUMIN SERPL-MCNC: 4.4 G/DL (ref 3.5–5.2)
ALBUMIN/GLOB SERPL: 1.8 G/DL
ALP SERPL-CCNC: 101 U/L (ref 39–117)
ALT SERPL-CCNC: 28 U/L (ref 1–41)
AST SERPL-CCNC: 19 U/L (ref 1–40)
BILIRUB SERPL-MCNC: 0.3 MG/DL (ref 0–1.2)
BUN SERPL-MCNC: 10 MG/DL (ref 8–23)
BUN/CREAT SERPL: 13.9 (ref 7–25)
CALCIUM SERPL-MCNC: 9.2 MG/DL (ref 8.6–10.5)
CHLORIDE SERPL-SCNC: 104 MMOL/L (ref 98–107)
CO2 SERPL-SCNC: 28.8 MMOL/L (ref 22–29)
CREAT SERPL-MCNC: 0.72 MG/DL (ref 0.76–1.27)
ERYTHROCYTE [DISTWIDTH] IN BLOOD BY AUTOMATED COUNT: 13.7 % (ref 12.3–15.4)
GLOBULIN SER CALC-MCNC: 2.4 GM/DL
GLUCOSE SERPL-MCNC: 113 MG/DL (ref 65–99)
HBA1C MFR BLD: 6 % (ref 4.8–5.6)
HCT VFR BLD AUTO: 44.6 % (ref 37.5–51)
HGB BLD-MCNC: 14.8 G/DL (ref 13–17.7)
MCH RBC QN AUTO: 29.4 PG (ref 26.6–33)
MCHC RBC AUTO-ENTMCNC: 33.2 G/DL (ref 31.5–35.7)
MCV RBC AUTO: 88.7 FL (ref 79–97)
PHENYTOIN SERPL-MCNC: 11.1 MCG/ML (ref 10–20)
PLATELET # BLD AUTO: 236 10*3/MM3 (ref 140–450)
POTASSIUM SERPL-SCNC: 4.5 MMOL/L (ref 3.5–5.2)
PROT SERPL-MCNC: 6.8 G/DL (ref 6–8.5)
RBC # BLD AUTO: 5.03 10*6/MM3 (ref 4.14–5.8)
SODIUM SERPL-SCNC: 141 MMOL/L (ref 136–145)
WBC # BLD AUTO: 5.12 10*3/MM3 (ref 3.4–10.8)

## 2022-03-09 DIAGNOSIS — M79.605 PAIN IN BOTH LOWER EXTREMITIES: ICD-10-CM

## 2022-03-09 DIAGNOSIS — M79.604 PAIN IN BOTH LOWER EXTREMITIES: ICD-10-CM

## 2022-03-09 RX ORDER — DICLOFENAC SODIUM 75 MG/1
TABLET, DELAYED RELEASE ORAL
Qty: 180 TABLET | Refills: 1 | Status: SHIPPED | OUTPATIENT
Start: 2022-03-09 | End: 2022-07-18 | Stop reason: SDUPTHER

## 2022-03-09 NOTE — TELEPHONE ENCOUNTER
Rx Refill Note  Requested Prescriptions     Pending Prescriptions Disp Refills   • diclofenac (VOLTAREN) 75 MG EC tablet [Pharmacy Med Name: DICLOFEN SOD TAB 75MG EC] 180 tablet 1     Sig: TAKE 1 TABLET TWICE A DAY      Last office visit with prescribing clinician: 1/19/2022      Next office visit with prescribing clinician: 7/18/2022    Last refill: 9/27/2021     Fabiola Rai MA  03/09/22, 08:51 CST

## 2022-03-25 DIAGNOSIS — R56.9 SEIZURE: ICD-10-CM

## 2022-03-25 RX ORDER — PHENYTOIN SODIUM 100 MG/1
CAPSULE, EXTENDED RELEASE ORAL
Qty: 540 CAPSULE | Refills: 1 | Status: SHIPPED | OUTPATIENT
Start: 2022-03-25 | End: 2022-07-18 | Stop reason: SDUPTHER

## 2022-03-25 NOTE — TELEPHONE ENCOUNTER
Rx Refill Note  Requested Prescriptions     Pending Prescriptions Disp Refills   • phenytoin ER (DILANTIN) 100 MG capsule [Pharmacy Med Name: PHENYTOIN EX CAP 100MG] 540 capsule 1     Sig: TAKE 3 CAPSULES EVERY      MORNING AND 3 CAPSULES     EVERY EVENING      Last office visit with prescribing clinician: 1/19/2022      Next office visit with prescribing clinician: 7/18/2022    Last refill: 1/19/2022    Fabiola Rai MA  03/25/22, 09:31 CDT

## 2022-07-18 ENCOUNTER — OFFICE VISIT (OUTPATIENT)
Dept: FAMILY MEDICINE CLINIC | Facility: CLINIC | Age: 62
End: 2022-07-18

## 2022-07-18 VITALS
SYSTOLIC BLOOD PRESSURE: 130 MMHG | WEIGHT: 300 LBS | HEART RATE: 68 BPM | HEIGHT: 70 IN | TEMPERATURE: 97.8 F | BODY MASS INDEX: 42.95 KG/M2 | OXYGEN SATURATION: 98 % | DIASTOLIC BLOOD PRESSURE: 84 MMHG | RESPIRATION RATE: 18 BRPM

## 2022-07-18 DIAGNOSIS — M79.605 PAIN IN BOTH LOWER EXTREMITIES: ICD-10-CM

## 2022-07-18 DIAGNOSIS — R53.83 OTHER FATIGUE: ICD-10-CM

## 2022-07-18 DIAGNOSIS — E78.2 MIXED HYPERLIPIDEMIA: ICD-10-CM

## 2022-07-18 DIAGNOSIS — R73.01 IMPAIRED FASTING GLUCOSE: ICD-10-CM

## 2022-07-18 DIAGNOSIS — R56.9 SEIZURE: Primary | ICD-10-CM

## 2022-07-18 DIAGNOSIS — M79.604 PAIN IN BOTH LOWER EXTREMITIES: ICD-10-CM

## 2022-07-18 PROCEDURE — 99214 OFFICE O/P EST MOD 30 MIN: CPT | Performed by: NURSE PRACTITIONER

## 2022-07-18 RX ORDER — ATORVASTATIN CALCIUM 20 MG/1
20 TABLET, FILM COATED ORAL DAILY
Qty: 90 TABLET | Refills: 1 | Status: SHIPPED | OUTPATIENT
Start: 2022-07-18 | End: 2023-01-17

## 2022-07-18 RX ORDER — DICLOFENAC SODIUM 75 MG/1
75 TABLET, DELAYED RELEASE ORAL 2 TIMES DAILY
Qty: 180 TABLET | Refills: 1 | Status: SHIPPED | OUTPATIENT
Start: 2022-07-18 | End: 2023-01-20 | Stop reason: SDUPTHER

## 2022-07-18 RX ORDER — PHENYTOIN SODIUM 100 MG/1
CAPSULE, EXTENDED RELEASE ORAL
Qty: 540 CAPSULE | Refills: 1 | Status: SHIPPED | OUTPATIENT
Start: 2022-07-18 | End: 2023-01-20

## 2022-07-18 NOTE — PROGRESS NOTES
Chief Complaint  Hyperlipidemia (Follow up)    Subjective        Brady Merlos presents to [facility]  Here for follow up for seizures and hyperlipidemia.  He has not had any seizures in 30+ yrs.  Denies any chest pain, shortness of breath or palpitaitons.  He does have some fatigue. Has sleep apnea stable with CPAP wears it every night    Seizures   This is a chronic problem. The current episode started more than 1 week ago. The problem has been gradually improving. There were more than 10 (has not had seizures in 30+ yrs) seizures. The most recent episode lasted 30 to 120 seconds. Associated symptoms include sleepiness. Characteristics include eye blinking, eye deviation and rhythmic jerking. The episode was witnessed. There was no sensation of an aura present. The seizures did not continue in the ED. There has been no fever.   Hyperlipidemia  This is a chronic problem. The current episode started more than 1 year ago. The problem is controlled. Recent lipid tests were reviewed and are normal. Exacerbating diseases include obesity. He has no history of chronic renal disease or hypothyroidism. There are no known factors aggravating his hyperlipidemia. Pertinent negatives include no focal sensory loss, focal weakness or leg pain. Current antihyperlipidemic treatment includes statins. The current treatment provides mild improvement of lipids. There are no compliance problems.  Risk factors for coronary artery disease include dyslipidemia, family history, male sex and obesity.     The following portions of the patient's history were reviewed and updated as appropriate: allergies, current medications, past family history, past medical history, past social history, past surgical history and problem list.    Objective   Vital Signs:  /84 (BP Location: Left arm, Patient Position: Sitting, Cuff Size: Large Adult)   Pulse 68   Temp 97.8 °F (36.6 °C) (Infrared)   Resp 18   Ht  "177.8 cm (70\") Comment: per patient  Wt 136 kg (300 lb)   SpO2 98%   BMI 43.05 kg/m²   Estimated body mass index is 43.05 kg/m² as calculated from the following:    Height as of this encounter: 177.8 cm (70\").    Weight as of this encounter: 136 kg (300 lb).    Class 3 Severe Obesity (BMI >=40). Obesity-related health conditions include the following: obstructive sleep apnea. Obesity is improving with treatment. BMI is is above average; BMI management plan is completed. We discussed portion control and increasing exercise.      Physical Exam  Vitals and nursing note reviewed.   Constitutional:       General: He is awake.      Appearance: Normal appearance. He is well-developed and well-groomed.   HENT:      Head: Normocephalic and atraumatic.      Right Ear: Hearing, tympanic membrane, ear canal and external ear normal.      Left Ear: Hearing, tympanic membrane, ear canal and external ear normal.      Nose: Nose normal.      Mouth/Throat:      Lips: Pink.      Pharynx: Oropharynx is clear.   Eyes:      General: Lids are normal.      Conjunctiva/sclera: Conjunctivae normal.   Cardiovascular:      Rate and Rhythm: Normal rate and regular rhythm.      Heart sounds: Normal heart sounds.   Pulmonary:      Effort: Pulmonary effort is normal.      Breath sounds: Normal breath sounds and air entry.   Musculoskeletal:      Cervical back: Full passive range of motion without pain.      Right lower leg: No edema.      Left lower leg: No edema.   Lymphadenopathy:      Head:      Right side of head: No submental, submandibular or tonsillar adenopathy.      Left side of head: No submental, submandibular or tonsillar adenopathy.   Skin:     General: Skin is warm and dry.   Neurological:      Mental Status: He is alert.      Sensory: Sensation is intact.      Motor: Motor function is intact.      Coordination: Coordination is intact.      Gait: Gait is intact.   Psychiatric:         Attention and Perception: Attention and " perception normal.         Mood and Affect: Mood and affect normal.         Speech: Speech normal.         Behavior: Behavior normal. Behavior is cooperative.         Thought Content: Thought content normal.         Cognition and Memory: Cognition and memory normal.         Judgment: Judgment normal.        Result Review :                Assessment and Plan   Diagnoses and all orders for this visit:    1. Seizure (HCC) (Primary)  -     Phenytoin level, total  -     phenytoin ER (DILANTIN) 100 MG capsule; TAKE 3 CAPSULES EVERY      MORNING AND 3 CAPSULES     EVERY EVENING  Dispense: 540 capsule; Refill: 1    2. Mixed hyperlipidemia  -     Lipid panel  -     atorvastatin (LIPITOR) 20 MG tablet; Take 1 tablet by mouth Daily.  Dispense: 90 tablet; Refill: 1    3. Impaired fasting glucose  -     Hemoglobin A1c    4. Other fatigue  -     CBC (No Diff)  -     Comprehensive metabolic panel    5. Pain in both lower extremities  -     diclofenac (VOLTAREN) 75 MG EC tablet; Take 1 tablet by mouth 2 (Two) Times a Day.  Dispense: 180 tablet; Refill: 1         I spent 14 minutes caring for Brady on this date of service. This time includes time spent by me in the following activities:preparing for the visit, performing a medically appropriate examination and/or evaluation , counseling and educating the patient/family/caregiver, ordering medications, tests, or procedures, documenting information in the medical record and care coordination     Follow Up      Return in about 6 months (around 1/18/2023) for Recheck.     Patient was given instructions and counseling regarding his condition or for health maintenance advice. Please see specific information pulled into the AVS if appropriate.     Electronically signed by Jamee Medina DNP, APRJOHN, 07/18/22, 9:12 AM CDT.

## 2022-07-19 LAB
ALBUMIN SERPL-MCNC: 4.2 G/DL (ref 3.5–5.2)
ALBUMIN/GLOB SERPL: 1.6 G/DL
ALP SERPL-CCNC: 99 U/L (ref 39–117)
ALT SERPL-CCNC: 32 U/L (ref 1–41)
AST SERPL-CCNC: 16 U/L (ref 1–40)
BILIRUB SERPL-MCNC: 0.2 MG/DL (ref 0–1.2)
BUN SERPL-MCNC: 10 MG/DL (ref 8–23)
BUN/CREAT SERPL: 13.3 (ref 7–25)
CALCIUM SERPL-MCNC: 9 MG/DL (ref 8.6–10.5)
CHLORIDE SERPL-SCNC: 105 MMOL/L (ref 98–107)
CHOLEST SERPL-MCNC: 179 MG/DL (ref 0–200)
CO2 SERPL-SCNC: 20.3 MMOL/L (ref 22–29)
CREAT SERPL-MCNC: 0.75 MG/DL (ref 0.76–1.27)
EGFRCR SERPLBLD CKD-EPI 2021: 102.7 ML/MIN/1.73
ERYTHROCYTE [DISTWIDTH] IN BLOOD BY AUTOMATED COUNT: 14.1 % (ref 12.3–15.4)
GLOBULIN SER CALC-MCNC: 2.6 GM/DL
GLUCOSE SERPL-MCNC: 113 MG/DL (ref 65–99)
HBA1C MFR BLD: 6.1 % (ref 4.8–5.6)
HCT VFR BLD AUTO: 44.1 % (ref 37.5–51)
HDLC SERPL-MCNC: 47 MG/DL (ref 40–60)
HGB BLD-MCNC: 14.7 G/DL (ref 13–17.7)
LDLC SERPL CALC-MCNC: 96 MG/DL (ref 0–100)
MCH RBC QN AUTO: 29.9 PG (ref 26.6–33)
MCHC RBC AUTO-ENTMCNC: 33.3 G/DL (ref 31.5–35.7)
MCV RBC AUTO: 89.8 FL (ref 79–97)
PHENYTOIN SERPL-MCNC: 13.3 MCG/ML (ref 10–20)
PLATELET # BLD AUTO: 196 10*3/MM3 (ref 140–450)
POTASSIUM SERPL-SCNC: 5.1 MMOL/L (ref 3.5–5.2)
PROT SERPL-MCNC: 6.8 G/DL (ref 6–8.5)
RBC # BLD AUTO: 4.91 10*6/MM3 (ref 4.14–5.8)
SODIUM SERPL-SCNC: 140 MMOL/L (ref 136–145)
TRIGL SERPL-MCNC: 211 MG/DL (ref 0–150)
VLDLC SERPL CALC-MCNC: 36 MG/DL (ref 5–40)
WBC # BLD AUTO: 5.32 10*3/MM3 (ref 3.4–10.8)

## 2022-07-26 ENCOUNTER — OFFICE VISIT (OUTPATIENT)
Dept: FAMILY MEDICINE CLINIC | Facility: CLINIC | Age: 62
End: 2022-07-26

## 2022-07-26 VITALS
HEART RATE: 69 BPM | WEIGHT: 295.3 LBS | BODY MASS INDEX: 42.28 KG/M2 | RESPIRATION RATE: 20 BRPM | SYSTOLIC BLOOD PRESSURE: 155 MMHG | HEIGHT: 70 IN | DIASTOLIC BLOOD PRESSURE: 89 MMHG | OXYGEN SATURATION: 96 % | TEMPERATURE: 97.3 F

## 2022-07-26 DIAGNOSIS — Z00.00 WELL ADULT EXAM: Primary | ICD-10-CM

## 2022-07-26 PROCEDURE — 99396 PREV VISIT EST AGE 40-64: CPT | Performed by: NURSE PRACTITIONER

## 2022-07-26 NOTE — PROGRESS NOTES
Chief Complaint   Patient presents with   • Annual Exam     physical     Subjective   History of Present Illness:  Brady Merlos is a 61 y.o. male who presents for an annual wellness.    Chronic problems: sleep apnea wears it minimum of 8 hours every night.  Sleeps good, feels rested.  Seizure disorder without any seizures for 30 years.     The following portions of the patient's history were reviewed and   updated as appropriate: allergies, current medications, past family history, past medical history, past social history, past surgical history and problem list.     Compared to one year ago, the patient feels his physical   health is better.    Compared to one year ago, the patient feels his mental   health is better.    Recent Hospitalizations:  He was not admitted to the hospital during the last year.       Current Medical Providers:  Patient Care Team:  Jamee Medina DNP, KAREEM as PCP - General  Jamee Medina DNP, APRN as PCP - Family Medicine    Outpatient Medications Prior to Visit   Medication Sig Dispense Refill   • atorvastatin (LIPITOR) 20 MG tablet Take 1 tablet by mouth Daily. 90 tablet 1   • diclofenac (VOLTAREN) 75 MG EC tablet Take 1 tablet by mouth 2 (Two) Times a Day. 180 tablet 1   • phenytoin ER (DILANTIN) 100 MG capsule TAKE 3 CAPSULES EVERY      MORNING AND 3 CAPSULES     EVERY EVENING 540 capsule 1     No facility-administered medications prior to visit.       No opioid medication identified on active medication list. I have reviewed chart for other potential  high risk medication/s and harmful drug interactions in the elderly.          Aspirin is not on active medication list.  Aspirin use is not indicated based on review of current medical condition/s. Risk of harm outweighs potential benefits.  .    Patient Active Problem List   Diagnosis   • Sleep apnea in adult   • Sleep apnea, obstructive   • Nuclear sclerosis, bilateral   • Bilateral retinal lattice degeneration   •  "Lattice degeneration of retina   • Nuclear senile cataract   • Round hole of retina without detachment   • Arthropathy of left knee   • History of seizure   • Hyperlipidemia     Advance Care Planning  Advance Directive is not on file.  ACP discussion was held with the patient during this visit. Patient does not have an advance directive, declines further assistance.    Review of Systems   HENT: Negative.    Respiratory: Negative.    Cardiovascular: Negative.    Gastrointestinal: Negative.    Genitourinary: Negative.    Musculoskeletal: Negative.    Skin: Negative.    Hematological: Negative.    Psychiatric/Behavioral: Negative.    All other systems reviewed and are negative.       Objective       Vitals:    07/26/22 0715   BP: 155/89   BP Location: Left arm   Patient Position: Sitting   Cuff Size: Adult   Pulse: 69   Resp: 20   Temp: 97.3 °F (36.3 °C)   TempSrc: Infrared   SpO2: 96%   Weight: 134 kg (295 lb 4.8 oz)   Height: 177.8 cm (70\")   PainSc: 0-No pain     Estimated body mass index is 42.37 kg/m² as calculated from the following:    Height as of this encounter: 177.8 cm (70\").    Weight as of this encounter: 134 kg (295 lb 4.8 oz).    Class 3 Severe Obesity (BMI >=40). Obesity-related health conditions include the following: obstructive sleep apnea and seizures, generalized joint pain. Obesity is improving with lifestyle modifications. BMI is is above average; BMI management plan is completed. We discussed portion control and increasing exercise. BMI is 42.4 weighed 300 pounds and has lost 5 pounds weighs 295.  He is walking 3 miles daily     Does the patient have evidence of cognitive impairment? No    Physical Exam  Vitals and nursing note reviewed.   Constitutional:       General: He is awake.      Appearance: Normal appearance. He is well-developed and well-groomed.   HENT:      Head: Normocephalic and atraumatic.      Right Ear: Hearing, tympanic membrane, ear canal and external ear normal.      Left " Ear: Hearing, tympanic membrane, ear canal and external ear normal.      Nose: Nose normal.      Mouth/Throat:      Lips: Pink.      Pharynx: Oropharynx is clear.   Eyes:      General: Lids are normal.      Conjunctiva/sclera: Conjunctivae normal.   Cardiovascular:      Rate and Rhythm: Normal rate and regular rhythm.      Heart sounds: Normal heart sounds.   Pulmonary:      Effort: Pulmonary effort is normal.      Breath sounds: Normal breath sounds and air entry.   Musculoskeletal:      Cervical back: Full passive range of motion without pain.      Right lower leg: No edema.      Left lower leg: No edema.   Lymphadenopathy:      Head:      Right side of head: No submental, submandibular or tonsillar adenopathy.      Left side of head: No submental, submandibular or tonsillar adenopathy.   Skin:     General: Skin is warm and dry.   Neurological:      Mental Status: He is alert.      Sensory: Sensation is intact.      Motor: Motor function is intact.      Coordination: Coordination is intact.      Gait: Gait is intact.   Psychiatric:         Attention and Perception: Attention and perception normal.         Mood and Affect: Mood and affect normal.         Speech: Speech normal.         Behavior: Behavior normal. Behavior is cooperative.         Thought Content: Thought content normal.         Cognition and Memory: Cognition and memory normal.         Judgment: Judgment normal.       Lab Results   Component Value Date    CHLPL 179 07/18/2022    TRIG 211 (H) 07/18/2022    HDL 47 07/18/2022    LDL 96 07/18/2022    VLDL 36 07/18/2022    HGBA1C 6.10 (H) 07/18/2022          HEALTH RISK ASSESSMENT    Smoking Status:  Social History     Tobacco Use   Smoking Status Never Smoker   Smokeless Tobacco Never Used     Alcohol Consumption:  Social History     Substance and Sexual Activity   Alcohol Use No     Fall Risk Screen:    Count includes the [facility] Fall Risk Assessment has not been completed.    Depression Screen:   PHQ-2/PHQ-9 Depression  Screening 1/19/2022   Retired PHQ-9 Total Score 0   Retired Total Score 0       Health Habits and Functional and Cognitive Screening:  No flowsheet data found.    Age-appropriate Screening Schedule:  Refer to the list below for future screening recommendations based on patient's age, sex and/or medical conditions. Orders for these recommended tests are listed in the plan section. The patient has been provided with a written plan.    Health Maintenance   Topic Date Due   • INFLUENZA VACCINE  10/01/2022   • LIPID PANEL  07/18/2023   • TDAP/TD VACCINES (2 - Td or Tdap) 06/16/2027   • ZOSTER VACCINE  Discontinued            Assessment & Plan     Diagnoses and all orders for this visit:    1. Well adult exam (Primary)    Health Maintenance Summary      (3 - Booster for Moderna series); Overdue since 9/5/2021 07/18/2022  Postponed until 7/20/2022 by Fabiola Rai MA (Patient Refused)    01/19/2022  Postponed until 3/1/2022 by Svetlana Santana LPN (Pending event)    04/05/2021  Imm Admin: COVID-19 (MODERNA) 1st, 2nd, 3rd Dose Only    03/08/2021  Imm Admin: COVID-19 (MODERNA) 1st, 2nd, 3rd Dose Only      INFLUENZA VACCINE; (Yearly - October to March); Next due on 10/1/2022  01/19/2022  Postponed until 3/31/2022 by Svetlana Santana LPN (Patient Refused)    12/28/2020  Postponed until 12/28/2021 by Lashawn Oneil MA (Patient Refused)    06/14/2019  Declined    12/01/2017  Declined    06/16/2017  Declined      LIPID PANEL; (Yearly); Next due on 7/18/2023 07/18/2022  Lipid panel    12/13/2021  Lipid panel    07/19/2021  Lipid panel    12/28/2020  Lipid panel    06/19/2020  Lipid panel         ANNUAL PHYSICAL; (Yearly); Next due on 7/27/2023 07/26/2022  Done    07/19/2021  Registry Metric: Last Annual Physical    06/19/2020  Done    06/14/2019  Done    06/14/2019  Done         COLORECTAL CANCER SCREENING; (COLONOSCOPY - Every 10 Years); Next due on 10/28/2025  10/14/2019  COLOGUARD (Done)    10/28/2015  COLONOSCOPY  (Patient-Reported (Performed Externally) - 1 small polyp)      TDAP/TD VACCINES; (2 - Td or Tdap); Next due on 6/16/2027 06/16/2017  Declined      HEPATITIS C SCREENING; Addressed  06/14/2019  Declined    06/16/2017  Declined      Pneumococcal Vaccine 0-64; (Series Information); Aged Out  No completion, postpone, frequency change, or communication history exists for this topic.     Discontinued - ZOSTER VACCINE; Discontinued  06/19/2020  Frequency changed to Never by Tom Galdamez APRN    06/14/2019  Postponed until 6/14/2019 by Jamee Medina DNP, APRN (Patient Refused)    06/08/2018  Postponed until 8/16/2018 by Jamee Medina DNP, APRN (Patient Refused)        HEALTH PROMOTION/ PREVENTION  I discussed and encouraged age appropriate health promotion/ prevention screenings and immunizations specific to this client: pneumococcal vaccine, zoster vaccine, Tdap, medicare annual wellness, lipid panel, hepatitis C screening, Hg A1C, diabetic eye exam, diabetic foot exam, urine for mircoalbumin, colonoscopy, and low dose CT of chest; the patient declines these recommendations at this time.         Follow Up:  Return in about 1 year (around 7/26/2023) for Annual physical.     An After Visit Summary and PPPS were made available to the patient.              Electronically signed by Jamee Medina DNP, APRN, 07/26/22, 8:01 AM CDT.

## 2023-01-14 DIAGNOSIS — E78.2 MIXED HYPERLIPIDEMIA: ICD-10-CM

## 2023-01-16 NOTE — TELEPHONE ENCOUNTER
Rx Refill Note  Requested Prescriptions     Pending Prescriptions Disp Refills   • atorvastatin (LIPITOR) 20 MG tablet [Pharmacy Med Name: ATORVASTATIN TAB 20MG] 90 tablet 1     Sig: TAKE 1 TABLET DAILY      Last office visit with prescribing clinician: 7/26/2022   Next office visit with prescribing clinician: 1/20/2023                         Would you like a call back once the refill request has been completed: [] Yes [] No    If the office needs to give you a call back, can they leave a voicemail: [] Yes [] No    Lali Amaro MA  01/16/23, 12:57 CST

## 2023-01-17 RX ORDER — ATORVASTATIN CALCIUM 20 MG/1
TABLET, FILM COATED ORAL
Qty: 90 TABLET | Refills: 1 | Status: SHIPPED | OUTPATIENT
Start: 2023-01-17 | End: 2023-01-20 | Stop reason: SDUPTHER

## 2023-01-20 ENCOUNTER — OFFICE VISIT (OUTPATIENT)
Dept: FAMILY MEDICINE CLINIC | Facility: CLINIC | Age: 63
End: 2023-01-20
Payer: COMMERCIAL

## 2023-01-20 VITALS
DIASTOLIC BLOOD PRESSURE: 77 MMHG | BODY MASS INDEX: 41.92 KG/M2 | OXYGEN SATURATION: 98 % | RESPIRATION RATE: 22 BRPM | HEIGHT: 70 IN | SYSTOLIC BLOOD PRESSURE: 159 MMHG | WEIGHT: 292.8 LBS | TEMPERATURE: 98.4 F | HEART RATE: 77 BPM

## 2023-01-20 DIAGNOSIS — I10 PRIMARY HYPERTENSION: Primary | ICD-10-CM

## 2023-01-20 DIAGNOSIS — M79.604 PAIN IN BOTH LOWER EXTREMITIES: ICD-10-CM

## 2023-01-20 DIAGNOSIS — R56.9 SEIZURE: ICD-10-CM

## 2023-01-20 DIAGNOSIS — M79.605 PAIN IN BOTH LOWER EXTREMITIES: ICD-10-CM

## 2023-01-20 DIAGNOSIS — Z12.5 ENCOUNTER FOR PROSTATE CANCER SCREENING: ICD-10-CM

## 2023-01-20 DIAGNOSIS — E78.2 MIXED HYPERLIPIDEMIA: ICD-10-CM

## 2023-01-20 DIAGNOSIS — Z13.1 SCREENING FOR DIABETES MELLITUS: ICD-10-CM

## 2023-01-20 PROCEDURE — 99214 OFFICE O/P EST MOD 30 MIN: CPT | Performed by: NURSE PRACTITIONER

## 2023-01-20 RX ORDER — LISINOPRIL 10 MG/1
10 TABLET ORAL DAILY
Qty: 90 TABLET | Refills: 1 | Status: SHIPPED | OUTPATIENT
Start: 2023-01-20

## 2023-01-20 RX ORDER — DICLOFENAC SODIUM 75 MG/1
75 TABLET, DELAYED RELEASE ORAL 2 TIMES DAILY
Qty: 180 TABLET | Refills: 1 | Status: SHIPPED | OUTPATIENT
Start: 2023-01-20

## 2023-01-20 RX ORDER — ATORVASTATIN CALCIUM 20 MG/1
20 TABLET, FILM COATED ORAL DAILY
Qty: 90 TABLET | Refills: 1 | Status: SHIPPED | OUTPATIENT
Start: 2023-01-20

## 2023-01-20 RX ORDER — PHENYTOIN SODIUM 100 MG/1
CAPSULE, EXTENDED RELEASE ORAL
Qty: 540 CAPSULE | Refills: 1 | Status: SHIPPED | OUTPATIENT
Start: 2023-01-20

## 2023-01-20 NOTE — PROGRESS NOTES
Chief Complaint  Seizures (Patient here for follow up. )    Subjective        Brady Merlos presents to [facility]  History of Present Illness  Presents for follow up for hyperlipidemia stable with atorvastatin, generalized joint aches and pains stable with diclofenac, seizures stable with phenytoin.  He is concerned because he doesn't feel like the generic brand of medication is working as good.  He hasn't had any seizures in years but would like to get a dilantin level and see about meds and he prefers to get brand name.  BP has been running high over the last month and today it is 159/77.  Denies chest pain, shortness of breath or palpitations   Seizures   This is a chronic problem. The current episode started more than 1 week ago. The problem has not changed since onset.Number of times: has not had any seizures in >5 yrs. Pertinent negatives include no chest pain. The episode was not witnessed. There was no sensation of an aura present. There has been no fever.   Hyperlipidemia  This is a chronic problem. The current episode started more than 1 year ago. The problem is uncontrolled. Recent lipid tests were reviewed and are high. Exacerbating diseases include obesity. He has no history of diabetes, liver disease or nephrotic syndrome. Pertinent negatives include no chest pain, focal weakness or shortness of breath. Current antihyperlipidemic treatment includes statins. The current treatment provides mild improvement of lipids. There are no compliance problems.  Risk factors for coronary artery disease include dyslipidemia, obesity and male sex.   Hypertension  This is a new problem. The current episode started 1 to 4 weeks ago. The problem has been gradually worsening since onset. The problem is uncontrolled. Pertinent negatives include no chest pain, neck pain, orthopnea or shortness of breath. There are no associated agents to hypertension. Risk factors for coronary artery  "disease include dyslipidemia, family history, male gender and obesity. Past treatments include nothing. Current antihypertension treatment includes ACE inhibitors. There are no compliance problems.  There is no history of kidney disease, heart failure or retinopathy.     The following portions of the patient's history were reviewed and updated as appropriate: allergies, current medications, past family history, past medical history, past social history, past surgical history and problem list.    Objective   Vital Signs:  /77 (BP Location: Right arm, Patient Position: Sitting, Cuff Size: Large Adult)   Pulse 77   Temp 98.4 °F (36.9 °C) (Infrared)   Resp 22   Ht 177.8 cm (70\")   Wt 133 kg (292 lb 12.8 oz)   SpO2 98%   BMI 42.01 kg/m²   Estimated body mass index is 42.01 kg/m² as calculated from the following:    Height as of this encounter: 177.8 cm (70\").    Weight as of this encounter: 133 kg (292 lb 12.8 oz).       Class 3 Severe Obesity (BMI >=40). Obesity-related health conditions include the following: hypertension and dyslipidemias. Obesity is improving with lifestyle modifications. BMI is is above average; BMI management plan is completed. We discussed portion control and increasing exercise.      Physical Exam  Vitals and nursing note reviewed.   Constitutional:       General: He is awake.      Appearance: Normal appearance. He is well-developed and well-groomed.   HENT:      Head: Normocephalic and atraumatic.      Right Ear: Hearing, tympanic membrane, ear canal and external ear normal.      Left Ear: Hearing, tympanic membrane, ear canal and external ear normal.      Nose: Nose normal.      Mouth/Throat:      Lips: Pink.      Pharynx: Oropharynx is clear.   Eyes:      General: Lids are normal.      Conjunctiva/sclera: Conjunctivae normal.   Cardiovascular:      Rate and Rhythm: Normal rate and regular rhythm.      Heart sounds: Normal heart sounds.   Pulmonary:      Effort: Pulmonary effort is " normal.      Breath sounds: Normal breath sounds and air entry.   Musculoskeletal:      Cervical back: Full passive range of motion without pain.      Right lower leg: No edema.      Left lower leg: No edema.   Lymphadenopathy:      Head:      Right side of head: No submental, submandibular or tonsillar adenopathy.      Left side of head: No submental, submandibular or tonsillar adenopathy.   Skin:     General: Skin is warm and dry.   Neurological:      Mental Status: He is alert.      Sensory: Sensation is intact.      Motor: Motor function is intact.      Coordination: Coordination is intact.      Gait: Gait is intact.   Psychiatric:         Attention and Perception: Attention and perception normal.         Mood and Affect: Mood and affect normal.         Speech: Speech normal.         Behavior: Behavior normal. Behavior is cooperative.         Thought Content: Thought content normal.         Cognition and Memory: Cognition and memory normal.         Judgment: Judgment normal.        Result Review :          Assessment and Plan   Diagnoses and all orders for this visit:    1. Primary hypertension (Primary)  -     lisinopril (PRINIVIL,ZESTRIL) 10 MG tablet; Take 1 tablet by mouth Daily.  Dispense: 90 tablet; Refill: 1  -     CBC (No Diff)  -     Comprehensive metabolic panel        -      Keep bp below goal >140/95    2. Mixed hyperlipidemia  -     atorvastatin (LIPITOR) 20 MG tablet; Take 1 tablet by mouth Daily.  Dispense: 90 tablet; Refill: 1  -     Lipid panel    3. Pain in both lower extremities  -     diclofenac (VOLTAREN) 75 MG EC tablet; Take 1 tablet by mouth 2 (Two) Times a Day.  Dispense: 180 tablet; Refill: 1    4. Seizure (HCC)  -     Dilantin 100 MG capsule; Take 3 capsules in the morning and 3 capsules in the evening  Dispense: 540 capsule; Refill: 1  -     Phenytoin level, total    5. Screening for diabetes mellitus  -     Hemoglobin A1c    6. Encounter for prostate cancer screening  -     PSA  Screen    pts levels have decreased on generic. PARAM         Follow Up   Return in about 6 months (around 7/20/2023) for Recheck.  Patient was given instructions and counseling regarding his condition or for health maintenance advice. Please see specific information pulled into the AVS if appropriate.     Electronically signed by Jamee Medina DNP, APRN, 01/31/23, 12:08 PM CST.

## 2023-01-21 LAB
ALBUMIN SERPL-MCNC: 4.2 G/DL (ref 3.5–5.2)
ALBUMIN/GLOB SERPL: 1.8 G/DL
ALP SERPL-CCNC: 98 U/L (ref 39–117)
ALT SERPL-CCNC: 29 U/L (ref 1–41)
AST SERPL-CCNC: 20 U/L (ref 1–40)
BILIRUB SERPL-MCNC: 0.3 MG/DL (ref 0–1.2)
BUN SERPL-MCNC: 10 MG/DL (ref 8–23)
BUN/CREAT SERPL: 15.4 (ref 7–25)
CALCIUM SERPL-MCNC: 8.9 MG/DL (ref 8.6–10.5)
CHLORIDE SERPL-SCNC: 104 MMOL/L (ref 98–107)
CHOLEST SERPL-MCNC: 184 MG/DL (ref 0–200)
CO2 SERPL-SCNC: 28.2 MMOL/L (ref 22–29)
CREAT SERPL-MCNC: 0.65 MG/DL (ref 0.76–1.27)
EGFRCR SERPLBLD CKD-EPI 2021: 106.5 ML/MIN/1.73
ERYTHROCYTE [DISTWIDTH] IN BLOOD BY AUTOMATED COUNT: 14.1 % (ref 12.3–15.4)
GLOBULIN SER CALC-MCNC: 2.4 GM/DL
GLUCOSE SERPL-MCNC: 113 MG/DL (ref 65–99)
HBA1C MFR BLD: 6 % (ref 4.8–5.6)
HCT VFR BLD AUTO: 44.1 % (ref 37.5–51)
HDLC SERPL-MCNC: 47 MG/DL (ref 40–60)
HGB BLD-MCNC: 14.7 G/DL (ref 13–17.7)
LDLC SERPL CALC-MCNC: 98 MG/DL (ref 0–100)
MCH RBC QN AUTO: 29.1 PG (ref 26.6–33)
MCHC RBC AUTO-ENTMCNC: 33.3 G/DL (ref 31.5–35.7)
MCV RBC AUTO: 87.2 FL (ref 79–97)
PHENYTOIN SERPL-MCNC: 7.9 MCG/ML (ref 10–20)
PLATELET # BLD AUTO: 190 10*3/MM3 (ref 140–450)
POTASSIUM SERPL-SCNC: 4.4 MMOL/L (ref 3.5–5.2)
PROT SERPL-MCNC: 6.6 G/DL (ref 6–8.5)
PSA SERPL-MCNC: 0.57 NG/ML (ref 0–4)
RBC # BLD AUTO: 5.06 10*6/MM3 (ref 4.14–5.8)
SODIUM SERPL-SCNC: 142 MMOL/L (ref 136–145)
TRIGL SERPL-MCNC: 228 MG/DL (ref 0–150)
VLDLC SERPL CALC-MCNC: 39 MG/DL (ref 5–40)
WBC # BLD AUTO: 6.03 10*3/MM3 (ref 3.4–10.8)

## 2023-01-22 DIAGNOSIS — E78.1 HYPERTRIGLYCERIDEMIA: ICD-10-CM

## 2023-01-22 DIAGNOSIS — N28.9 ABNORMAL KIDNEY FUNCTION: ICD-10-CM

## 2023-01-22 DIAGNOSIS — R56.9 SEIZURE: Primary | ICD-10-CM

## 2023-05-29 DIAGNOSIS — Z87.898 HISTORY OF SEIZURE: ICD-10-CM

## 2023-05-29 DIAGNOSIS — E78.2 MIXED HYPERLIPIDEMIA: Primary | ICD-10-CM

## 2023-05-29 DIAGNOSIS — R56.9 SEIZURES: ICD-10-CM

## 2023-05-29 DIAGNOSIS — H25.13 NUCLEAR SCLEROSIS, BILATERAL: ICD-10-CM

## 2023-07-25 ENCOUNTER — PATIENT OUTREACH (OUTPATIENT)
Dept: CASE MANAGEMENT | Facility: OTHER | Age: 63
End: 2023-07-25
Payer: COMMERCIAL

## 2023-07-25 NOTE — OUTREACH NOTE
"AMBULATORY CASE MANAGEMENT NOTE    Name and Relationship of Patient/Support Person: Brady Merlos \"Gera\" - Self    Patient Outreach    [facility] Tracking was activated for patient during an office visit with Primary Care.  Per review of record and conversation with patient, he has not logged any BP readings nor accessed the education modules.  Patient states to have gotten the blood pressure cuff from the PCP office.   Confirmed with patient, he sees on [employer] where to enter his BP readings.  He said he has been checking his BP and now he will enter the readings into his [employer].  Discussed HTN education. Patient verbalized understanding.      [facility]ment    Patient has/had own BP monitoring equipment?: no  CM/office assisted w/ BP equipment: izabela  Office provided BP cuff to patient?: yes       Adult Patient Profile  Questions/Answers      Flowsheet Row Most Recent Value   Symptoms/Conditions Managed at Home cardiovascular   Cardiovascular Symptoms/Conditions hypertension   Cardiovascular Management Strategies routine screening, medication therapy   Barriers to Taking Medication as Prescribed none   Source of Information patient, health record            Education Documentation  Self-Care, taught by Clara Giordano RN at 7/25/2023  1:54 PM.  Learner: Patient  Readiness: Acceptance  Method: Explanation  Response: Verbalizes Understanding    Medication Management, taught by Clara Giordano, FARTUN at 7/25/2023  1:54 PM.  Learner: Patient  Readiness: Acceptance  Method: Explanation  Response: Verbalizes Understanding    Blood Pressure Monitoring, taught by Clara Giordano, FARTUN at 7/25/2023  1:54 PM.  Learner: Patient  Readiness: Acceptance  Method: Explanation  Response: Verbalizes Understanding          Clara MCKEE  Ambulatory Case Management    7/25/2023, 13:54 CDT  "

## 2023-08-07 DIAGNOSIS — R56.9 SEIZURE: ICD-10-CM

## 2023-08-07 RX ORDER — PHENYTOIN SODIUM 100 MG/1
CAPSULE, EXTENDED RELEASE ORAL
Qty: 540 CAPSULE | Refills: 1 | Status: SHIPPED | OUTPATIENT
Start: 2023-08-07

## 2023-08-07 NOTE — TELEPHONE ENCOUNTER
Rx Refill Note  Requested Prescriptions     Pending Prescriptions Disp Refills    Dilantin 100 MG capsule 540 capsule 1     Sig: Take 3 capsules in the morning and 3 capsules in the evening      Last office visit with prescribing clinician: 7/21/2023   Next office visit with prescribing clinician: 1/22/2024       Fabiola Rai CMA  08/07/23, 12:05 CDT

## 2023-08-10 ENCOUNTER — PATIENT MESSAGE (OUTPATIENT)
Dept: FAMILY MEDICINE CLINIC | Facility: CLINIC | Age: 63
End: 2023-08-10
Payer: COMMERCIAL

## 2023-08-10 NOTE — TELEPHONE ENCOUNTER
From: Brady Merlos  To: Jamee Medina  Sent: 8/10/2023 12:23 PM CDT  Subject: Prescription Renewal for Dilantin    Just received this e-mail from [facility] regarding my prescription for Dilantin. Not sure if there is a delay in them receiving the message you sent on August 7 or if there is something else wrong.     Your prescription is on hold  We need some details from your doctor before we can fill it          Before we can fill your prescription, we need to confirm a few missing details about your medication with your doctor. We've reached out to them, but haven't heard back.    We know how important it is for you to get the medication you need, so we're asking for your help getting in touch.    We know how important your prescription is to your health and well-being. Please contact your doctor and ask them to call our mail order pharmacy about the below medication.

## 2023-08-11 ENCOUNTER — TELEPHONE (OUTPATIENT)
Dept: FAMILY MEDICINE CLINIC | Facility: CLINIC | Age: 63
End: 2023-08-11
Payer: COMMERCIAL

## 2023-08-22 ENCOUNTER — PATIENT OUTREACH (OUTPATIENT)
Dept: CASE MANAGEMENT | Facility: OTHER | Age: 63
End: 2023-08-22
Payer: COMMERCIAL

## 2023-08-22 ENCOUNTER — TELEPHONE (OUTPATIENT)
Dept: CASE MANAGEMENT | Facility: OTHER | Age: 63
End: 2023-08-22
Payer: COMMERCIAL

## 2023-08-22 NOTE — OUTREACH NOTE
"AMBULATORY CASE MANAGEMENT NOTE    Name and Relationship of Patient/Support Person: Brady Merlos CHICA \"Gera\" - Self    Patient Outreach    RN-ACM review of [facility] blood pressure readings summary report. Per review, patient is successfully logging blood pressure readings. No issues with accessing the education components have been reported. No escalations have been received for this patient.     RN-ACM called patient to follow up for any questions or issues.  Patient said he was doing okay; he continues to monitor his BP every day and enter the readings into [email or web address].  He wants to continue using the BP program on [employer]. He voiced compliance with medications daily as ordered. He mentioned having occasional dizzy spells.  Discussed importance of staying well hydrated; of communicating with his PCP his symptoms out of the norm.  Patient voiced understanding.     RN-ACM will send a telephone message to PCP with update on BP's and sx of dizziness.     Clara MCKEE  Ambulatory Case Management    8/22/2023, 16:20 CDT  "

## 2023-08-22 NOTE — TELEPHONE ENCOUNTER
Patient is continuing to use the [facility] Care Companion program for HTN.  He voiced compliance daily with HTN medication in the evening time; he monitors his BP every AM and records it in [facility] program. The program has not reported any Escalations in BP's. However I wanted you to be aware of what his BP's have been lately, and his c/o today of occasional dizzy spells.  Thank you.  8/22/23 - 143/95  8/21/23 - 156/90  8/20/23 - 137/83  8/19/23 - 149/78  8/18/23 - 151/83  8/17/23 - 132/83  8/16/23 - 158/87  8/15/23 - 128/84  8/14/23 - 153/86  8/13/23 - 157/86

## 2023-09-05 ENCOUNTER — PATIENT OUTREACH (OUTPATIENT)
Dept: CASE MANAGEMENT | Facility: OTHER | Age: 63
End: 2023-09-05
Payer: COMMERCIAL

## 2023-09-05 ENCOUNTER — TELEPHONE (OUTPATIENT)
Dept: CASE MANAGEMENT | Facility: OTHER | Age: 63
End: 2023-09-05
Payer: COMMERCIAL

## 2023-09-05 NOTE — OUTREACH NOTE
"AMBULATORY CASE MANAGEMENT NOTE    Name and Relationship of Patient/Support Person: Brady Merlos CHICA \"Gera\" - Self    Patient Outreach    Noted an Escalation in patient's [facility] Companion program for HTN, that indicated patient is not taking his BP medications as prescribed due to side effects of dizziness with movement of his head left to right.  RN-ACM called patient to discuss his symptoms.  He said he is still not taking his BP medications due to his dizziness with head movement left to right.  Patient reported continuing to monitor his BP and enter into his [facility] program.  Discussed importance of close PCP follow up to discern cause for the dizziness, and get PCP recommendations on BP medication.  He voiced understanding and said he would call to schedule an appt.     Care Coordination    A telephone note was sent to the PCP Clinical Pool, for PCP's awareness of symptoms and patient's stopping his BP medication because of it.     Clara MCKEE  Ambulatory Case Management    9/5/2023, 13:47 CDT  "

## 2023-09-05 NOTE — TELEPHONE ENCOUNTER
I wanted KAREEM Gonzalez, to be aware of an Escalation that the [facility] Care Companion Program for HTN reported.  Patient responded to the [employer] questions and let it be known that he is not taking his Blood Pressure medications as prescribed due to symptoms of dizziness when turning head back and forth left to right.  The MyChart program shows that patient has continued to monitor his BP and enter the readings into [employer].  Jamee will want to look at his BP's as recorded in his record. An example of his most recent BP readings: 9/5/23 - 8:02am - 153/88 - HR 63  /   9/4/23 - 8:35am - 159/90 - HR 66.    I have called the patient today about his symptoms and blood pressure.  He said he has not taken his BP medication since his symptoms started.  I advised patient to call your office today, to schedule a f/u appt with PCP regarding the symptoms and his BP management.    Thank you,    Clara MCMILLAN RN-[facility]  Ambulatory   [phone]

## 2023-09-08 ENCOUNTER — OFFICE VISIT (OUTPATIENT)
Dept: FAMILY MEDICINE CLINIC | Facility: CLINIC | Age: 63
End: 2023-09-08
Payer: COMMERCIAL

## 2023-09-08 VITALS
HEIGHT: 70 IN | SYSTOLIC BLOOD PRESSURE: 164 MMHG | HEART RATE: 70 BPM | BODY MASS INDEX: 42.09 KG/M2 | OXYGEN SATURATION: 99 % | DIASTOLIC BLOOD PRESSURE: 91 MMHG | WEIGHT: 294 LBS | TEMPERATURE: 98.6 F | RESPIRATION RATE: 18 BRPM

## 2023-09-08 VITALS
OXYGEN SATURATION: 99 % | HEART RATE: 70 BPM | WEIGHT: 294 LBS | DIASTOLIC BLOOD PRESSURE: 91 MMHG | SYSTOLIC BLOOD PRESSURE: 164 MMHG | BODY MASS INDEX: 42.09 KG/M2 | HEIGHT: 70 IN | RESPIRATION RATE: 18 BRPM | TEMPERATURE: 98.6 F

## 2023-09-08 DIAGNOSIS — Z00.01 ENCOUNTER FOR WELL ADULT EXAM WITH ABNORMAL FINDINGS: Primary | ICD-10-CM

## 2023-09-08 DIAGNOSIS — I10 PRIMARY HYPERTENSION: Primary | ICD-10-CM

## 2023-09-08 DIAGNOSIS — R42 VERTIGO: ICD-10-CM

## 2023-09-08 PROCEDURE — 99396 PREV VISIT EST AGE 40-64: CPT | Performed by: NURSE PRACTITIONER

## 2023-09-08 PROCEDURE — 99214 OFFICE O/P EST MOD 30 MIN: CPT | Performed by: NURSE PRACTITIONER

## 2023-09-08 RX ORDER — LOSARTAN POTASSIUM 50 MG/1
50 TABLET ORAL DAILY
Qty: 90 TABLET | Refills: 0 | Status: SHIPPED | OUTPATIENT
Start: 2023-09-08

## 2023-09-08 RX ORDER — MECLIZINE HYDROCHLORIDE 25 MG/1
25 TABLET ORAL 3 TIMES DAILY PRN
Qty: 90 TABLET | Refills: 0 | Status: SHIPPED | OUTPATIENT
Start: 2023-09-08

## 2023-09-08 NOTE — PROGRESS NOTES
CC: well exam    Subjective        Brady Merlos is a 62 y.o. male who presents for an annual well visit     Chronic problems include HTN that is uncontrolled over the last couple weeks, seizures stable with dilantin, vertigo evaluated today in other visit and hyperlipidemia stable with atorvastatin     The following portions of the patient's history were reviewed and updated as appropriate: allergies, current medications, past family history, past medical history, past social history, past surgical history, and problem list.    Compared to one year ago, the patient feels his physical  health is the same.    Compared to one year ago, the patient feels his mental health is the same.    Recent Hospitalizations:  He was not admitted to the hospital during the last year.       Current Medical Providers:  Patient Care Team:  Jamee Medina DNP, APRJOHN as PCP - General  Jamee Medina DNP, APRN as PCP - Family Medicine  Clara Giordano RN as Ambulatory  ([facility])    Outpatient Medications Prior to Visit   Medication Sig Dispense Refill    atorvastatin (LIPITOR) 20 MG tablet Take 1 tablet by mouth Daily. 90 tablet 1    diclofenac (VOLTAREN) 75 MG EC tablet Take 1 tablet by mouth 2 (Two) Times a Day. 180 tablet 1    Dilantin 100 MG capsule Take 3 capsules in the morning and 3 capsules in the evening 540 capsule 1    losartan (Cozaar) 50 MG tablet Take 1 tablet by mouth Daily. 90 tablet 0     No facility-administered medications prior to visit.       No opioid medication identified on active medication list. I have reviewed chart for other potential  high risk medication/s and harmful drug interactions in the elderly.        Aspirin is not on active medication list.  Aspirin use is not indicated based on review of current medical condition/s. Risk of harm outweighs potential benefits.  .    Patient Active Problem List   Diagnosis    Sleep apnea in adult    Sleep apnea, obstructive    Nuclear  "sclerosis, bilateral    Bilateral retinal lattice degeneration    Lattice degeneration of retina    Nuclear senile cataract    Round hole of retina without detachment    Arthropathy of left knee    History of seizure    Hyperlipidemia     Advance Care Planning   Advance Care Planning     Advance Directive is not on file.  ACP discussion was held with the patient during this visit. Patient does not have an advance directive, information provided.     Objective    There were no vitals filed for this visit.  Estimated body mass index is 42.18 kg/m² as calculated from the following:    Height as of an earlier encounter on 23: 177.8 cm (70\").    Weight as of an earlier encounter on 23: 133 kg (294 lb).      Does the patient have evidence of cognitive impairment? No    Lab Results   Component Value Date    CHLPL 162 2023    TRIG 199 (H) 2023    HDL 45 2023    LDL 83 2023    VLDL 34 2023          HEALTH RISK ASSESSMENT    Smoking Status:  Social History     Tobacco Use   Smoking Status Never   Smokeless Tobacco Never     Alcohol Consumption:  Social History     Substance and Sexual Activity   Alcohol Use No     Fall Risk Screen:    STEADI Fall Risk Assessment was completed, and patient is at LOW risk for falls.Assessment completed on:2023    Depression Screenin/8/2023    10:00 AM   PHQ-2/PHQ-9 Depression Screening   Little Interest or Pleasure in Doing Things 0-->not at all   Feeling Down, Depressed or Hopeless 0-->not at all   PHQ-9: Brief Depression Severity Measure Score 0       Health Habits and Functional and Cognitive Screenin/8/2023    10:00 AM   Functional & Cognitive Status   Do you have difficulty preparing food and eating? No   Do you have difficulty bathing yourself, getting dressed or grooming yourself? No   Do you have difficulty using the toilet? No   Do you have difficulty moving around from place to place? No   Do you have trouble with steps or " getting out of a bed or a chair? No   Current Diet Well Balanced Diet   Dental Exam Up to date   Eye Exam Up to date   Exercise (times per week) 4 times per week   Do you need help using the phone?  No   Are you deaf or do you have serious difficulty hearing?  No   Do you need help to go to places out of walking distance? No   Do you need help shopping? No   Do you need help preparing meals?  No   Do you need help with housework?  No   Do you need help with laundry? No   Do you need help taking your medications? No   Do you need help managing money? No   Do you ever drive or ride in a car without wearing a seat belt? No   Have you felt unusual stress, anger or loneliness in the last month? No   Who do you live with? Spouse   If you need help, do you have trouble finding someone available to you? No   Have you been bothered in the last four weeks by sexual problems? No   Do you have difficulty concentrating, remembering or making decisions? No       Age-appropriate Screening Schedule:  Refer to the list below for future screening recommendations based on patient's age, sex and/or medical conditions. Orders for these recommended tests are listed in the plan section. The patient has been provided with a written plan.    Health Maintenance   Topic Date Due    ANNUAL PHYSICAL  07/26/2023    COVID-19 Vaccine (3 - Moderna series) 09/10/2023 (Originally 5/31/2021)    INFLUENZA VACCINE  10/01/2023    LIPID PANEL  07/14/2024    BMI FOLLOWUP  09/08/2024    COLORECTAL CANCER SCREENING  10/28/2025    TDAP/TD VACCINES (2 - Td or Tdap) 06/16/2027    HEPATITIS C SCREENING  Addressed    Pneumococcal Vaccine 0-64  Aged Out    ZOSTER VACCINE  Discontinued                Physical Exam  Vitals and nursing note reviewed.   Constitutional:       General: He is awake.      Appearance: Normal appearance. He is well-developed and well-groomed.   HENT:      Head: Normocephalic and atraumatic.      Right Ear: Hearing, tympanic membrane, ear  canal and external ear normal.      Left Ear: Hearing, tympanic membrane, ear canal and external ear normal.      Nose: Nose normal.      Mouth/Throat:      Lips: Pink.      Pharynx: Oropharynx is clear.   Eyes:      General: Lids are normal.      Conjunctiva/sclera: Conjunctivae normal.   Cardiovascular:      Rate and Rhythm: Normal rate and regular rhythm.      Heart sounds: Normal heart sounds.   Pulmonary:      Effort: Pulmonary effort is normal.      Breath sounds: Normal breath sounds and air entry.   Musculoskeletal:      Cervical back: Full passive range of motion without pain.      Right lower leg: No edema.      Left lower leg: No edema.   Lymphadenopathy:      Head:      Right side of head: No submental, submandibular or tonsillar adenopathy.      Left side of head: No submental, submandibular or tonsillar adenopathy.   Skin:     General: Skin is warm and dry.   Neurological:      Mental Status: He is alert.      Sensory: Sensation is intact.      Motor: Motor function is intact.      Coordination: Coordination is intact.      Gait: Gait is intact.   Psychiatric:         Attention and Perception: Attention and perception normal.         Mood and Affect: Mood and affect normal.         Speech: Speech normal.         Behavior: Behavior normal. Behavior is cooperative.         Thought Content: Thought content normal.         Cognition and Memory: Cognition and memory normal.         Judgment: Judgment normal.         The above risks/problems have been discussed with the patient.  Pertinent information has been shared with the patient in the After Visit Summary.    Diagnoses and all orders for this visit:    1. Encounter for well adult exam with abnormal findings (Primary)      Health Maintenance Summary    Postponed - COVID-19 Vaccine: (3 - Moderna series): Postponed until 9/10/2023  09/08/2023  Postponed until 9/10/2023 by Fabiola Rai CMA (Patient Refused)    07/21/2023  Postponed until 7/23/2023 by  Jamee Medina DNP, APRN (Patient Refused)    01/20/2023  Postponed until 1/22/2023 by Jamee Median DNP, KAREEM (Patient Refused)    07/18/2022  Postponed until 7/20/2022 by Fabiola Rai MA (Patient Refused)    01/19/2022  Postponed until 3/1/2022 by Svetlana Santana LPN (Pending event)         INFLUENZA VACCINE: (Yearly - October to March): Next due on 10/1/2023  01/20/2023  Postponed until 3/31/2023 by Jamee Medina DNP, APRN (Patient Refused)    01/19/2022  Postponed until 3/31/2022 by Svetlana Santana LPN (Patient Refused)    12/28/2020  Postponed until 12/28/2021 by Lashawn Oneil MA (Patient Refused)    06/14/2019  Declined    12/01/2017  Declined         LIPID PANEL: (Yearly): Next due on 7/14/2024 07/14/2023  Lipid panel    01/20/2023  Lipid panel    07/18/2022  Lipid panel    12/13/2021  Lipid panel    07/19/2021  Lipid panel         ANNUAL PHYSICAL: (Yearly): Next due on 9/8/2024 09/08/2023  Done    07/26/2022  Done    06/19/2020  Done    06/14/2019  Done    06/14/2019  Done         BMI FOLLOWUP: (Yearly): Next due on 9/8/2024 09/08/2023  Smart[employer]: WORKFLOW - QUALITY MEASUREMENT - BMI FOLLOW UP CARE PLAN DOCUMENTED    07/21/2023  SmartData: WORKFLOW - QUALITY MEASUREMENT - BMI FOLLOW UP CARE PLAN DOCUMENTED    01/20/2023  SmartData: WORKFLOW - QUALITY MEASUREMENT - BMI FOLLOW UP CARE PLAN DOCUMENTED    07/26/2022  SmartData: WORKFLOW - QUALITY MEASUREMENT - BMI FOLLOW UP CARE PLAN DOCUMENTED    07/18/2022  SmartData: WORKFLOW - QUALITY MEASUREMENT - BMI FOLLOW UP CARE PLAN DOCUMENTED      COLORECTAL CANCER SCREENING: (COLONOSCOPY - Every 10 Years): Next due on 10/28/2025  10/14/2019  COLOGUARD (Done)    10/14/2019  Cologuard - Stool, Per Rectum    10/28/2015  COLONOSCOPY (Patient-Reported (Performed Externally) - 1 small polyp)      TDAP/TD VACCINES: (2 - Td or Tdap): Next due on 6/16/2027 06/16/2017  Declined      HEPATITIS C SCREENING: Addressed  06/14/2019  Declined     06/16/2017  Declined      Pneumococcal Vaccine 0-64: (Series Information): Aged Out  No completion, postpone, frequency change, or communication history exists for this topic.     Discontinued - ZOSTER VACCINE    Discontinued  06/19/2020  Frequency changed to Never by Tom Galdamez APRN    06/14/2019  Postponed until 6/14/2019 by Jamee Medina DNP, APRN (Patient Refused)    06/08/2018  Postponed until 8/16/2018 by Jamee Medina DNP, APRN (Patient Refused)      2. Vertigo       - he is getting worked up for the vertigo and will         Return in about 1 year (around 9/8/2024) for Annual physical.      Electronically signed by Jamee Medina DNP, APRN, 09/08/23, 10:28 AM CDT.

## 2023-09-08 NOTE — PROGRESS NOTES
"Chief Complaint  Hypertension (Pt reports episodes of dizziness)    Subjective        Brady Merlos presents to [facility]  History of Present Illness    8/22/23 - 143/95  8/21/23 - 156/90  8/20/23 - 137/83  8/19/23 - 149/78  8/18/23 - 151/83  8/17/23 - 132/83  8/16/23 - 158/87  8/15/23 - 128/84  8/14/23 - 153/86  8/13/23 - 157/86    The following portions of the patient's history were reviewed and updated as appropriate: allergies, current medications, past family history, past medical history, past social history, past surgical history and problem list.    Class 3 Severe Obesity (BMI >=40). Obesity-related health conditions include the following: hypertension and seizures . Obesity is worsening. BMI is is above average; BMI management plan is completed. We discussed portion control and increasing exercise.      Objective   Vital Signs:  /91 (BP Location: Left arm, Patient Position: Sitting, Cuff Size: Large Adult)   Pulse 70   Temp 98.6 °F (37 °C) (Infrared)   Resp 18   Ht 177.8 cm (70\")   Wt 133 kg (294 lb)   SpO2 99%   BMI 42.18 kg/m²   Estimated body mass index is 42.18 kg/m² as calculated from the following:    Height as of this encounter: 177.8 cm (70\").    Weight as of this encounter: 133 kg (294 lb).       Physical Exam  Vitals and nursing note reviewed.   Constitutional:       General: He is awake.      Appearance: Normal appearance. He is well-developed and well-groomed.   HENT:      Head: Normocephalic and atraumatic.      Right Ear: Hearing, tympanic membrane, ear canal and external ear normal.      Left Ear: Hearing, tympanic membrane, ear canal and external ear normal.      Nose: Nose normal.      Mouth/Throat:      Lips: Pink.      Pharynx: Oropharynx is clear.   Eyes:      General: Lids are normal.      Conjunctiva/sclera: Conjunctivae normal.   Cardiovascular:      Rate and Rhythm: Normal rate and regular rhythm.      Heart sounds: Normal heart " sounds.   Pulmonary:      Effort: Pulmonary effort is normal.      Breath sounds: Normal breath sounds and air entry.   Musculoskeletal:      Cervical back: Full passive range of motion without pain.      Right lower leg: No edema.      Left lower leg: No edema.   Lymphadenopathy:      Head:      Right side of head: No submental, submandibular or tonsillar adenopathy.      Left side of head: No submental, submandibular or tonsillar adenopathy.   Skin:     General: Skin is warm and dry.   Neurological:      Mental Status: He is alert and oriented to person, place, and time.      Cranial Nerves: Cranial nerves 2-12 are intact.      Sensory: Sensation is intact.      Motor: Motor function is intact.      Coordination: Coordination is intact.      Gait: Gait is intact.   Psychiatric:         Attention and Perception: Attention and perception normal.         Mood and Affect: Mood and affect normal.         Speech: Speech normal.         Behavior: Behavior normal. Behavior is cooperative.         Thought Content: Thought content normal.         Cognition and Memory: Cognition and memory normal.         Judgment: Judgment normal.      Result Review :                   Assessment and Plan   Diagnoses and all orders for this visit:    1. Primary hypertension (Primary)  -     losartan (Cozaar) 50 MG tablet; Take 1 tablet by mouth Daily.  Dispense: 90 tablet; Refill: 0        -     continue on the care companion monitoring bp         -     stop the lisinopril    2. Vertigo  -     meclizine (ANTIVERT) 25 MG tablet; Take 1 tablet by mouth 3 (Three) Times a Day As Needed for Dizziness.  Dispense: 90 tablet; Refill: 0        -     discussed doing MRI or ct head, he declines wants to see if it gets better with the discontinuing of lisinopril        -     Discussed sending to PT for epley maneuver but declines        -     Take caution when getting from laying position to sitting and dangle feet before getting up            Follow  Up   Return in about 1 month (around 10/8/2023).  Patient was given instructions and counseling regarding his condition or for health maintenance advice. Please see specific information pulled into the AVS if appropriate.       Answers submitted by the patient for this visit:  Primary Reason for Visit (Submitted on 9/7/2023)  What is the primary reason for your visit?: High Blood Pressure

## 2023-09-14 ENCOUNTER — TELEPHONE (OUTPATIENT)
Dept: FAMILY MEDICINE CLINIC | Facility: CLINIC | Age: 63
End: 2023-09-14
Payer: COMMERCIAL

## 2023-09-14 NOTE — TELEPHONE ENCOUNTER
[facility] Justin[facility]ctor wants to speak to a nurse about a drug to drug interaction. The drugs are Losartan and lisonpril 10 mg. Please call [phone] option 2 ref# [id number].

## 2023-09-15 NOTE — TELEPHONE ENCOUNTER
Called [facility] natasha back to inquire on interaction- spoke with Tod. Notified that Lisinopril 10 and 20mg are to be discontinued and current therapy is losartan 50mg.

## 2023-09-18 ENCOUNTER — PATIENT OUTREACH (OUTPATIENT)
Dept: CASE MANAGEMENT | Facility: OTHER | Age: 63
End: 2023-09-18
Payer: COMMERCIAL

## 2023-09-18 ENCOUNTER — TELEPHONE (OUTPATIENT)
Dept: CASE MANAGEMENT | Facility: OTHER | Age: 63
End: 2023-09-18

## 2023-09-18 NOTE — OUTREACH NOTE
"AMBULATORY CASE MANAGEMENT NOTE    Name and Relationship of Patient/Support Person: Brady Merlos \"Gera\" - Self    Patient Outreach    RN-ABBEY reviewed chart for BP 's in [facility] program.  Noted that patient had an Escalation notice, as he responded that he has not taken his BP medication. Patient communicated that he stopped the Lisinopril several weeks ago, due to symptoms of dizziness.  He saw PCP on 9/8/23, and PCP directed him to stop the Lisinopril and added Losartan 50mg in its place. Patient said he just got the Losartan and started it this AM.  Patient said he would continue to [facility] the [facility].  Discussed symptoms of dizziness.  Patient said even off of the Lisinopril, he would have some spells of dizziness, off and on.  Discussed with patient the importance of communicating with his PCP, if dizzy spells continue.  He voiced understanding and agreement.      Care Coordination    A telephone encounter was placed and routed to the PCP clinical pool with information regarding patient's dizziness off of BP med, and starting of new BP med today, 9/18/23.      Clara MCKEE  Ambulatory Case Management    9/18/2023, 13:00 CDT  "

## 2023-09-18 NOTE — TELEPHONE ENCOUNTER
In speaking with patient today, he has been off of Lisinopril for BP, for several weeks now, due to symptoms of dizziness off and on. He said he has continued to have some dizzy spells, off and on, while being off of the Lisinopril.  He said he just received his new BP medication, Losartan 50mg and started it today, 9/18/23.  He said he will continue to enter his BP's into the MyChart program for BP management.     FARTUN Elizabeth-ACM  Ambulatory

## 2023-10-19 ENCOUNTER — PATIENT OUTREACH (OUTPATIENT)
Dept: CASE MANAGEMENT | Facility: OTHER | Age: 63
End: 2023-10-19
Payer: COMMERCIAL

## 2023-10-19 ENCOUNTER — TELEPHONE (OUTPATIENT)
Dept: CASE MANAGEMENT | Facility: OTHER | Age: 63
End: 2023-10-19
Payer: COMMERCIAL

## 2023-10-19 NOTE — OUTREACH NOTE
AMBULATORY CASE MANAGEMENT NOTE    Name and Relationship of Patient/Support Person:  -     Patient Outreach    RN-ABBEY called patient to follow up on BP management and [facility] BP program.  The [facility] BP program was to automatically close, 10/18/23, after a 3 month episode.   Patient said he was able to enter his BP today into his [facility] account.   Patient stated he is no longer having dizzy spells; he is compliant with his BP medication as PCP directed, each AM.  He said he monitors his BP pretty soon after taking his medication each AM. Discussed with patient, to try checking his BP about 2 hours after taking his medication, to see if BP improves, and record it.  Talked with patient about continuing to monitor his BP and recording it on paper, or his phone, after the [facility] BP program is closed; taking this log into his PCP appts with him for the doctor to see. He voiced understanding.  Patient voiced no issues or concerns today.    Care Coordination    A Telephone Encounter was placed and routed to the PCP Clinical Pool for PCP's awareness of latest BP readings, and closing of the [facility] BP program.    Clara MCKEE  Ambulatory Case Management    10/19/2023, 15:36 CDT

## 2023-10-19 NOTE — TELEPHONE ENCOUNTER
I just wanted KAREEM Gonzalez to be aware of this patient's Blood Pressures for the month of October, noted on [facility] Care Compan[facility] program for HTN.  I thought she would want to know.  This [facility] BP program was to automatically end on 10/18/23, as it was set up for a 3 month episode.  However, patient said he entered a BP today.    In conversation with him today, he said he no longer has the dizzy spells; he is taking his medication as his PCP directed.  He did tell me that he has been taking his BP medication, then pretty quickly after that, checking his BP and recording it in [employer].  I encouraged him to check his BP a couple hours after taking his medication and seeing if BP improves.  He said he would try that.           I see he has next appt with KAREEM Gonzalez on 1-22-23..      Thank you,  Clara MCMILLAN, RN-ACM  Ambulatory

## 2023-10-30 ENCOUNTER — PATIENT MESSAGE (OUTPATIENT)
Dept: FAMILY MEDICINE CLINIC | Facility: CLINIC | Age: 63
End: 2023-10-30
Payer: COMMERCIAL

## 2023-10-30 DIAGNOSIS — I10 PRIMARY HYPERTENSION: Primary | ICD-10-CM

## 2023-10-30 NOTE — TELEPHONE ENCOUNTER
From: Dottie JIMÉNEZ  To: Brady Merlos  Sent: 10/30/2023 10:20 AM CDT  Subject: Blood pressure    Gera:     Jamee wanted to follow up with you on your blood pressure readings. Can you please send me a message back with your readings so I can forward to her to review?     Thanks,     MARIA Sinclair

## 2023-10-31 DIAGNOSIS — I10 PRIMARY HYPERTENSION: Primary | ICD-10-CM

## 2023-10-31 DIAGNOSIS — I10 PRIMARY HYPERTENSION: ICD-10-CM

## 2023-10-31 RX ORDER — LOSARTAN POTASSIUM 50 MG/1
50 TABLET ORAL DAILY
Qty: 90 TABLET | Refills: 0 | Status: SHIPPED | OUTPATIENT
Start: 2023-10-31 | End: 2023-11-01

## 2023-10-31 RX ORDER — HYDROCHLOROTHIAZIDE 25 MG/1
25 TABLET ORAL DAILY
Qty: 30 TABLET | Refills: 0 | Status: SHIPPED | OUTPATIENT
Start: 2023-10-31

## 2023-10-31 RX ORDER — LOSARTAN POTASSIUM 50 MG/1
50 TABLET ORAL DAILY
Qty: 90 TABLET | Refills: 0 | OUTPATIENT
Start: 2023-10-31

## 2023-10-31 NOTE — TELEPHONE ENCOUNTER
Rx Refill Note  Requested Prescriptions     Pending Prescriptions Disp Refills    losartan (Cozaar) 50 MG tablet 90 tablet 0     Sig: Take 1 tablet by mouth Daily.     Refused Prescriptions Disp Refills    losartan (COZAAR) 50 MG tablet [Pharmacy Med Name: LOSARTAN TAB 50MG] 90 tablet 0     Sig: TAKE 1 TABLET DAILY     Refused By: DOMINICK VALENCIA     Reason for Refusal: Patient has requested refill too soon      Last office visit with prescribing clinician: 9/8/2023   Last telemedicine visit with prescribing clinician: Visit date not found   Next office visit with prescribing clinician: 1/22/2024     LRX:9/8/2023 for 3 months    Dottie Contreras MA  10/31/23, 16:10 CDT

## 2023-11-01 RX ORDER — LOSARTAN POTASSIUM 50 MG/1
50 TABLET ORAL DAILY
Qty: 30 TABLET | Refills: 0 | Status: SHIPPED | OUTPATIENT
Start: 2023-11-01

## 2023-11-25 DIAGNOSIS — I10 PRIMARY HYPERTENSION: ICD-10-CM

## 2023-11-27 RX ORDER — HYDROCHLOROTHIAZIDE 25 MG/1
25 TABLET ORAL DAILY
Qty: 90 TABLET | Refills: 1 | Status: SHIPPED | OUTPATIENT
Start: 2023-11-27

## 2023-11-27 NOTE — TELEPHONE ENCOUNTER
Rx Refill Note  Requested Prescriptions     Pending Prescriptions Disp Refills    hydroCHLOROthiazide (HYDRODIURIL) 25 MG tablet [Pharmacy Med Name: HYDROCHLOROT TAB 25MG] 30 tablet 0     Sig: TAKE 1 TABLET DAILY      Last office visit with prescribing clinician: 9/8/2023   Last telemedicine visit with prescribing clinician: Visit date not found   Next office visit with prescribing clinician: 1/22/2024     Zuly Vasquez MA  11/27/23, 12:20 CST

## 2024-01-22 ENCOUNTER — OFFICE VISIT (OUTPATIENT)
Dept: FAMILY MEDICINE CLINIC | Facility: CLINIC | Age: 64
End: 2024-01-22
Payer: COMMERCIAL

## 2024-01-22 VITALS
HEIGHT: 70 IN | DIASTOLIC BLOOD PRESSURE: 83 MMHG | TEMPERATURE: 98 F | WEIGHT: 295 LBS | BODY MASS INDEX: 42.23 KG/M2 | SYSTOLIC BLOOD PRESSURE: 135 MMHG | RESPIRATION RATE: 18 BRPM | HEART RATE: 76 BPM | OXYGEN SATURATION: 99 %

## 2024-01-22 DIAGNOSIS — M79.605 PAIN IN BOTH LOWER EXTREMITIES: ICD-10-CM

## 2024-01-22 DIAGNOSIS — M79.604 PAIN IN BOTH LOWER EXTREMITIES: ICD-10-CM

## 2024-01-22 DIAGNOSIS — G47.33 SLEEP APNEA, OBSTRUCTIVE: ICD-10-CM

## 2024-01-22 DIAGNOSIS — R56.9 SEIZURE: Primary | ICD-10-CM

## 2024-01-22 DIAGNOSIS — E78.2 MIXED HYPERLIPIDEMIA: ICD-10-CM

## 2024-01-22 DIAGNOSIS — I10 PRIMARY HYPERTENSION: ICD-10-CM

## 2024-01-22 PROCEDURE — 99214 OFFICE O/P EST MOD 30 MIN: CPT | Performed by: NURSE PRACTITIONER

## 2024-01-22 RX ORDER — HYDROCHLOROTHIAZIDE 25 MG/1
25 TABLET ORAL DAILY
Qty: 90 TABLET | Refills: 1 | Status: SHIPPED | OUTPATIENT
Start: 2024-01-22

## 2024-01-22 RX ORDER — DICLOFENAC SODIUM 75 MG/1
75 TABLET, DELAYED RELEASE ORAL 2 TIMES DAILY
Qty: 180 TABLET | Refills: 1 | Status: SHIPPED | OUTPATIENT
Start: 2024-01-22

## 2024-01-22 RX ORDER — PHENYTOIN SODIUM 100 MG/1
CAPSULE, EXTENDED RELEASE ORAL
Qty: 540 CAPSULE | Refills: 1 | Status: SHIPPED | OUTPATIENT
Start: 2024-01-22

## 2024-01-22 RX ORDER — ATORVASTATIN CALCIUM 20 MG/1
20 TABLET, FILM COATED ORAL DAILY
Qty: 90 TABLET | Refills: 1 | Status: SHIPPED | OUTPATIENT
Start: 2024-01-22

## 2024-01-22 RX ORDER — LOSARTAN POTASSIUM 50 MG/1
50 TABLET ORAL DAILY
Qty: 30 TABLET | Refills: 0 | Status: SHIPPED | OUTPATIENT
Start: 2024-01-22

## 2024-01-22 NOTE — PROGRESS NOTES
Chief Complaint  Hypertension (Pt here for follow up)    Subjective        Brady Merlos presents to [facility]  History of Present Illness    The patient is a 63-year-old male who is here for follow-up for hyperlipidemia and hypertension.    He denies any more dizzy spell spells and just normal wear and tear and aches and pains on the body currently. He has not been checking his blood pressure for the last month or so. He denies any headaches, chest pain, or dyspnea. He denies any ankle edema.    Currently her hyperlipidemia is controlled with atorvastatin. Her hypertension is controlled with losartan and HCTZ and seizures. His last seizure was 47 years ago.      His hypertension is chronic. He's had it more than a year. Blood pressure today is good at 135/83 mmHg.    He has sleep apnea and uses his CPAP machine religiously. He uses his CPAP anywhere from 7.5 to 9 hours at night.     He does not have any agents associated with hypertension. There is no amphetamines, no steroids and no decongestants. Treatments include losartan, CAD risk include hyperlipidemia, family history, obesity, male gender, see, he has had improvement on the losartan and HCTZ has no compliance problems, does not have endorgan damage of kidney disease, PVD or CVA. Hyperlipidemia is chronic. He has had it more than a year, it is controlled. Lipids normal. He does not have exasperating diseases of hypothyroidism, chronic renal disease, diabetes, or liver disease. It is not aggravated by anything and the associated symptoms no sensory loss, no focal weakness,  and no dyspnea. His current therapy is a statin and diet change with improvement. No compliance issues. He has insomnia which is secondary to obstructive sleep apnea. He does wear a CPAP. Progression is improving. He does not have any associated symptoms such as congestion, chills, paresthesia, weakness, no more vertigo does have some arthralgias.  "Nothing is aggravating it. Treatments include using the CPAP.     He has had seizures for over 40 years. He has had less than 5 seizures in his lifetime. He thinks all of his seizures were witnessed. He was told he had a grand mal seizure. He denies any fever, confusion, visual disturbances, or muscle weakness.    There is no focality and there is no problems with the med or doses. He has not had recent illness.    He does not drink alcohol.    His grandmother had a grand mal seizure.    He has received 2 COVID-19 vaccines. He has never had a flu vaccine.    The following portions of the patient's history were reviewed and updated as appropriate: allergies, current medications, past family history, past medical history, past social history, past surgical history and problem list.    Class 3 Severe Obesity (BMI >=40). Obesity-related health conditions include the following: obstructive sleep apnea, hypertension, and dyslipidemias. Obesity is unchanged. BMI is is above average; BMI management plan is completed. We discussed portion control and increasing exercise.      Objective   Vital Signs:  /83 (BP Location: Left arm, Patient Position: Sitting, Cuff Size: Large Adult)   Pulse 76   Temp 98 °F (36.7 °C) (Infrared)   Resp 18   Ht 177.8 cm (70\") Comment: per patient  Wt 134 kg (295 lb)   SpO2 99%   BMI 42.33 kg/m²   Estimated body mass index is 42.33 kg/m² as calculated from the following:    Height as of this encounter: 177.8 cm (70\").    Weight as of this encounter: 134 kg (295 lb).       Physical Exam  Vitals and nursing note reviewed.   Constitutional:       General: He is awake.      Appearance: Normal appearance. He is well-developed and well-groomed.   HENT:      Head: Normocephalic and atraumatic.      Right Ear: Hearing, tympanic membrane, ear canal and external ear normal.      Left Ear: Hearing, tympanic membrane, ear canal and external ear normal.      Nose: Nose normal.      Mouth/Throat:     "  Lips: Pink.      Pharynx: Oropharynx is clear.   Eyes:      General: Lids are normal.      Conjunctiva/sclera: Conjunctivae normal.   Cardiovascular:      Rate and Rhythm: Normal rate and regular rhythm.      Heart sounds: Normal heart sounds.   Pulmonary:      Effort: Pulmonary effort is normal.      Breath sounds: Normal breath sounds and air entry.   Musculoskeletal:      Cervical back: Full passive range of motion without pain.      Right lower leg: No edema.      Left lower leg: No edema.   Lymphadenopathy:      Head:      Right side of head: No submental, submandibular or tonsillar adenopathy.      Left side of head: No submental, submandibular or tonsillar adenopathy.   Skin:     General: Skin is warm and dry.   Neurological:      Mental Status: He is alert.      Sensory: Sensation is intact.      Motor: Motor function is intact.      Coordination: Coordination is intact.      Gait: Gait is intact.   Psychiatric:         Attention and Perception: Attention and perception normal.         Mood and Affect: Mood and affect normal.         Speech: Speech normal.         Behavior: Behavior normal. Behavior is cooperative.         Thought Content: Thought content normal.         Cognition and Memory: Cognition and memory normal.         Judgment: Judgment normal.        Result Review :                Assessment and Plan     Diagnoses and all orders for this visit:    1. Seizure (Primary)  -     Phenytoin level, total  -     phenytoin ER (Dilantin) 100 MG capsule; Take 3 capsules in the morning and 3 capsules in the evening  Dispense: 540 capsule; Refill: 1    2. Primary hypertension  -     CBC & Differential  -     Comprehensive metabolic panel  -     hydroCHLOROthiazide (HYDRODIURIL) 25 MG tablet; Take 1 tablet by mouth Daily.  Dispense: 90 tablet; Refill: 1  -     losartan (Cozaar) 50 MG tablet; Take 1 tablet by mouth Daily. Short fill only- pt to use mail order after  Dispense: 30 tablet; Refill: 0    3. Mixed  hyperlipidemia  -     Lipid Panel  -     atorvastatin (LIPITOR) 20 MG tablet; Take 1 tablet by mouth Daily.  Dispense: 90 tablet; Refill: 1    4. Sleep apnea, obstructive    5. Pain in both lower extremities  -     diclofenac (VOLTAREN) 75 MG EC tablet; Take 1 tablet by mouth 2 (Two) Times a Day.  Dispense: 180 tablet; Refill: 1             Follow Up     Return in about 6 months (around 7/22/2024) for Recheck.  Patient was given instructions and counseling regarding his condition or for health maintenance advice. Please see specific information pulled into the AVS if appropriate.         Transcribed from ambient dictation for Jamee Medina DNP, APRN by Aisha Ramos.  01/22/24   09:46 CST    Patient or patient representative verbalized consent to the visit recording.  I have personally performed the services described in this document as transcribed by the above individual, and it is both accurate and complete.    Electronically signed by Jamee Medina DNP, KAREEM, 01/22/24, 10:35 AM CST.

## 2024-01-23 LAB
ALBUMIN SERPL-MCNC: 4.7 G/DL (ref 3.5–5.2)
ALBUMIN/GLOB SERPL: 2.9 G/DL
ALP SERPL-CCNC: 94 U/L (ref 39–117)
ALT SERPL-CCNC: 29 U/L (ref 1–41)
AST SERPL-CCNC: 20 U/L (ref 1–40)
BASOPHILS # BLD AUTO: 0.01 10*3/MM3 (ref 0–0.2)
BASOPHILS NFR BLD AUTO: 0.2 % (ref 0–1.5)
BILIRUB SERPL-MCNC: 0.3 MG/DL (ref 0–1.2)
BUN SERPL-MCNC: 12 MG/DL (ref 8–23)
BUN/CREAT SERPL: 14.6 (ref 7–25)
CALCIUM SERPL-MCNC: 9 MG/DL (ref 8.6–10.5)
CHLORIDE SERPL-SCNC: 105 MMOL/L (ref 98–107)
CHOLEST SERPL-MCNC: 163 MG/DL (ref 0–200)
CO2 SERPL-SCNC: 25 MMOL/L (ref 22–29)
CREAT SERPL-MCNC: 0.82 MG/DL (ref 0.76–1.27)
EGFRCR SERPLBLD CKD-EPI 2021: 98.7 ML/MIN/1.73
EOSINOPHIL # BLD AUTO: 0.04 10*3/MM3 (ref 0–0.4)
EOSINOPHIL NFR BLD AUTO: 0.8 % (ref 0.3–6.2)
ERYTHROCYTE [DISTWIDTH] IN BLOOD BY AUTOMATED COUNT: 14 % (ref 12.3–15.4)
GLOBULIN SER CALC-MCNC: 1.6 GM/DL
GLUCOSE SERPL-MCNC: 119 MG/DL (ref 65–99)
HCT VFR BLD AUTO: 43.8 % (ref 37.5–51)
HDLC SERPL-MCNC: 48 MG/DL (ref 40–60)
HGB BLD-MCNC: 14.7 G/DL (ref 13–17.7)
IMM GRANULOCYTES # BLD AUTO: 0.01 10*3/MM3 (ref 0–0.05)
IMM GRANULOCYTES NFR BLD AUTO: 0.2 % (ref 0–0.5)
LDLC SERPL CALC-MCNC: 90 MG/DL (ref 0–100)
LYMPHOCYTES # BLD AUTO: 1.02 10*3/MM3 (ref 0.7–3.1)
LYMPHOCYTES NFR BLD AUTO: 19.2 % (ref 19.6–45.3)
MCH RBC QN AUTO: 29.7 PG (ref 26.6–33)
MCHC RBC AUTO-ENTMCNC: 33.6 G/DL (ref 31.5–35.7)
MCV RBC AUTO: 88.5 FL (ref 79–97)
MONOCYTES # BLD AUTO: 0.36 10*3/MM3 (ref 0.1–0.9)
MONOCYTES NFR BLD AUTO: 6.8 % (ref 5–12)
NEUTROPHILS # BLD AUTO: 3.87 10*3/MM3 (ref 1.7–7)
NEUTROPHILS NFR BLD AUTO: 72.8 % (ref 42.7–76)
NRBC BLD AUTO-RTO: 0 /100 WBC (ref 0–0.2)
PHENYTOIN SERPL-MCNC: 13.2 MCG/ML (ref 10–20)
PLATELET # BLD AUTO: 214 10*3/MM3 (ref 140–450)
POTASSIUM SERPL-SCNC: 4.3 MMOL/L (ref 3.5–5.2)
PROT SERPL-MCNC: 6.3 G/DL (ref 6–8.5)
RBC # BLD AUTO: 4.95 10*6/MM3 (ref 4.14–5.8)
SODIUM SERPL-SCNC: 146 MMOL/L (ref 136–145)
TRIGL SERPL-MCNC: 144 MG/DL (ref 0–150)
VLDLC SERPL CALC-MCNC: 25 MG/DL (ref 5–40)
WBC # BLD AUTO: 5.31 10*3/MM3 (ref 3.4–10.8)

## 2024-01-24 DIAGNOSIS — R73.01 IMPAIRED FASTING GLUCOSE: Primary | ICD-10-CM

## 2024-01-24 DIAGNOSIS — E87.0 HYPERNATREMIA: ICD-10-CM

## 2024-02-23 ENCOUNTER — PATIENT MESSAGE (OUTPATIENT)
Dept: FAMILY MEDICINE CLINIC | Facility: CLINIC | Age: 64
End: 2024-02-23
Payer: COMMERCIAL

## 2024-02-23 DIAGNOSIS — G47.33 SLEEP APNEA, OBSTRUCTIVE: Primary | ICD-10-CM

## 2024-02-29 DIAGNOSIS — I10 PRIMARY HYPERTENSION: ICD-10-CM

## 2024-03-01 RX ORDER — LOSARTAN POTASSIUM 50 MG/1
50 TABLET ORAL DAILY
Qty: 90 TABLET | Refills: 0 | Status: SHIPPED | OUTPATIENT
Start: 2024-03-01

## 2024-03-01 NOTE — TELEPHONE ENCOUNTER
Rx Refill Note  Requested Prescriptions     Pending Prescriptions Disp Refills    losartan (COZAAR) 50 MG tablet [Pharmacy Med Name: LOSARTAN TAB 50MG] 30 tablet 0     Sig: TAKE 1 TABLET DAILY      Last office visit with prescribing clinician: 1/22/2024   Next office visit with prescribing clinician: 7/22/2024                         Would you like a call back once the refill request has been completed: [] Yes [] No    If the office needs to give you a call back, can they leave a voicemail: [] Yes [] No    Lali Amaro MA  03/01/24, 08:10 CST

## 2024-03-06 ENCOUNTER — TELEPHONE (OUTPATIENT)
Dept: FAMILY MEDICINE CLINIC | Facility: CLINIC | Age: 64
End: 2024-03-06
Payer: COMMERCIAL

## 2024-03-06 NOTE — TELEPHONE ENCOUNTER
Francy from [facility] called and said she needs the order for the CPAP faxed to [phone]. She also needs the reason documented for the patient needing another CPAP for example the end of it's use. She also needs demographic page and copy of his sleep study.

## 2024-03-06 NOTE — TELEPHONE ENCOUNTER
No sleep study on file- called pt to inquire as to when and where he had completed. Pt reports that it was an at home sleep study, not sure what company this was completed through. Reports that his current equipment came from [facility], which he thinks that [employer] bought out.   Called Francy back to see if they have a copy of sleep study on file, will have to check archives and will call back. If unable to locate sleep study copy- pt will need new sleep study ordered and completed for new equipment.

## 2024-05-03 DIAGNOSIS — R56.9 SEIZURE: ICD-10-CM

## 2024-05-03 NOTE — TELEPHONE ENCOUNTER
Rx Refill Note  Requested Prescriptions     Pending Prescriptions Disp Refills    phenytoin ER (DILANTIN) 100 MG capsule [Pharmacy Med Name: PHENYTOIN EX CAP 100MG] 540 capsule 1     Sig: TAKE 3 CAPSULES IN THE     MORNING AND 3 CAPSULES IN  THE EVENING (DISREGARD     BRAND NAME DILANTIN)      Last office visit with prescribing clinician: 1/22/2024   Last telemedicine visit with prescribing clinician: Visit date not found   Next office visit with prescribing clinician: 7/22/2024                         Would you like a call back once the refill request has been completed: [] Yes [] No    If the office needs to give you a call back, can they leave a voicemail: [] Yes [] No    Candis Man MA  05/03/24, 16:32 CDT

## 2024-05-04 RX ORDER — PHENYTOIN SODIUM 100 MG/1
CAPSULE, EXTENDED RELEASE ORAL
Qty: 540 CAPSULE | Refills: 1 | Status: SHIPPED | OUTPATIENT
Start: 2024-05-04

## 2024-05-21 DIAGNOSIS — I10 PRIMARY HYPERTENSION: ICD-10-CM

## 2024-05-21 RX ORDER — LOSARTAN POTASSIUM 50 MG/1
50 TABLET ORAL DAILY
Qty: 90 TABLET | Refills: 0 | Status: SHIPPED | OUTPATIENT
Start: 2024-05-21

## 2024-06-27 DIAGNOSIS — I10 PRIMARY HYPERTENSION: ICD-10-CM

## 2024-06-28 RX ORDER — HYDROCHLOROTHIAZIDE 25 MG/1
25 TABLET ORAL DAILY
Qty: 90 TABLET | Refills: 1 | OUTPATIENT
Start: 2024-06-28

## 2024-06-28 NOTE — TELEPHONE ENCOUNTER
Too early to refill.     Rx Refill Note  Requested Prescriptions     Pending Prescriptions Disp Refills    hydroCHLOROthiazide 25 MG tablet [Pharmacy Med Name: HYDROCHLOROT TAB 25MG] 90 tablet 1     Sig: TAKE 1 TABLET DAILY      Last office visit with prescribing clinician: 1/22/2024   Last telemedicine visit with prescribing clinician: Visit date not found   Next office visit with prescribing clinician: 7/22/2024                         Would you like a call back once the refill request has been completed: [] Yes [] No    If the office needs to give you a call back, can they leave a voicemail: [] Yes [] No    Svetlana Santana LPN  06/28/24, 08:46 CDT

## 2024-07-20 DIAGNOSIS — R56.9 SEIZURE: ICD-10-CM

## 2024-07-22 ENCOUNTER — OFFICE VISIT (OUTPATIENT)
Dept: FAMILY MEDICINE CLINIC | Facility: CLINIC | Age: 64
End: 2024-07-22
Payer: COMMERCIAL

## 2024-07-22 VITALS
HEIGHT: 70 IN | RESPIRATION RATE: 20 BRPM | TEMPERATURE: 98.8 F | BODY MASS INDEX: 41.95 KG/M2 | HEART RATE: 71 BPM | OXYGEN SATURATION: 98 % | DIASTOLIC BLOOD PRESSURE: 78 MMHG | SYSTOLIC BLOOD PRESSURE: 120 MMHG | WEIGHT: 293 LBS

## 2024-07-22 DIAGNOSIS — R56.9 SEIZURE: Primary | ICD-10-CM

## 2024-07-22 DIAGNOSIS — E78.2 MIXED HYPERLIPIDEMIA: ICD-10-CM

## 2024-07-22 DIAGNOSIS — I10 PRIMARY HYPERTENSION: ICD-10-CM

## 2024-07-22 PROCEDURE — 99214 OFFICE O/P EST MOD 30 MIN: CPT | Performed by: NURSE PRACTITIONER

## 2024-07-22 RX ORDER — LOSARTAN POTASSIUM 50 MG/1
50 TABLET ORAL DAILY
Qty: 90 TABLET | Refills: 0 | Status: SHIPPED | OUTPATIENT
Start: 2024-07-22

## 2024-07-22 RX ORDER — PHENYTOIN SODIUM 100 MG/1
CAPSULE, EXTENDED RELEASE ORAL
Qty: 540 CAPSULE | Refills: 0 | OUTPATIENT
Start: 2024-07-22

## 2024-07-22 RX ORDER — PHENYTOIN SODIUM 100 MG/1
CAPSULE, EXTENDED RELEASE ORAL
Qty: 540 CAPSULE | Refills: 1 | Status: SHIPPED | OUTPATIENT
Start: 2024-07-22

## 2024-07-22 RX ORDER — HYDROCHLOROTHIAZIDE 25 MG/1
25 TABLET ORAL DAILY
Qty: 90 TABLET | Refills: 1 | Status: SHIPPED | OUTPATIENT
Start: 2024-07-22

## 2024-07-22 RX ORDER — ATORVASTATIN CALCIUM 20 MG/1
20 TABLET, FILM COATED ORAL DAILY
Qty: 90 TABLET | Refills: 1 | Status: SHIPPED | OUTPATIENT
Start: 2024-07-22

## 2024-07-22 NOTE — PROGRESS NOTES
Chief Complaint  Hypertension (Patient here for follow up. /)    Subjective        Brady Merlos presents to [facility]  History of Present Illness  Presents for follow up for HTN stable with losartan, hyperlipidemia stable with atorvastatin, seizures stable with dilantin.    Hypertension  This is a chronic problem. The current episode started more than 1 year ago. The problem is controlled. Pertinent negatives include no blurred vision, chest pain, headaches, neck pain, orthopnea or shortness of breath. There are no associated agents to hypertension. Risk factors for coronary artery disease include dyslipidemia, male gender, obesity and sedentary lifestyle. Past treatments include ACE inhibitors and angiotensin blockers. Current antihypertension treatment includes angiotensin blockers and diuretics. The current treatment provides mild improvement. There are no compliance problems.  There is no history of kidney disease, heart failure or retinopathy. There is no history of chronic renal disease.   Hyperlipidemia  This is a chronic problem. The problem is controlled. Recent lipid tests were reviewed and are high. Exacerbating diseases include obesity. He has no history of chronic renal disease, hypothyroidism or liver disease. There are no known factors aggravating his hyperlipidemia. Pertinent negatives include no chest pain or shortness of breath. Current antihyperlipidemic treatment includes statins. The current treatment provides mild improvement of lipids. There are no compliance problems.  Risk factors for coronary artery disease include hypertension, dyslipidemia and male sex.   Seizures   This is a chronic problem. Episode onset: 30 years ago. The problem has been gradually improving. There were 2 to 3 seizures. Pertinent negatives include no sleepiness, no headaches, no visual disturbance and no chest pain. Characteristics include eye blinking, eye deviation and loss of  "consciousness. The episode was Witnessed. There was No sensation of an aura present. The seizures Did not continue in the ED. The seizure(s) had no focality. There has been no fever.     The following portions of the patient's history were reviewed and updated as appropriate: allergies, current medications, past family history, past medical history, past social history, past surgical history and problem list.    Objective   Vital Signs:  /83 (BP Location: Left arm, Patient Position: Sitting, Cuff Size: Large Adult)   Pulse 71   Temp 98.8 °F (37.1 °C)   Resp 20   Ht 177.8 cm (70\")   Wt 133 kg (293 lb)   SpO2 98%   BMI 42.04 kg/m²   Estimated body mass index is 42.04 kg/m² as calculated from the following:    Height as of this encounter: 177.8 cm (70\").    Weight as of this encounter: 133 kg (293 lb).     Physical Exam  Vitals and nursing note reviewed.   Constitutional:       General: He is awake.      Appearance: Normal appearance. He is well-developed and well-groomed.   HENT:      Head: Normocephalic and atraumatic.      Right Ear: Hearing, tympanic membrane, ear canal and external ear normal.      Left Ear: Hearing, tympanic membrane, ear canal and external ear normal.      Nose: Nose normal.      Mouth/Throat:      Lips: Pink.      Pharynx: Oropharynx is clear.   Eyes:      General: Lids are normal.      Conjunctiva/sclera: Conjunctivae normal.   Cardiovascular:      Rate and Rhythm: Normal rate and regular rhythm.      Heart sounds: Normal heart sounds.   Pulmonary:      Effort: Pulmonary effort is normal.      Breath sounds: Normal breath sounds and air entry.   Musculoskeletal:      Cervical back: Full passive range of motion without pain.      Right lower leg: No edema.      Left lower leg: No edema.   Lymphadenopathy:      Head:      Right side of head: No submental, submandibular or tonsillar adenopathy.      Left side of head: No submental, submandibular or tonsillar adenopathy.   Skin:     " General: Skin is warm and dry.   Neurological:      Mental Status: He is alert.      Sensory: Sensation is intact.      Motor: Motor function is intact.      Coordination: Coordination is intact.      Gait: Gait is intact.   Psychiatric:         Attention and Perception: Attention and perception normal.         Mood and Affect: Mood and affect normal.         Speech: Speech normal.         Behavior: Behavior normal. Behavior is cooperative.         Thought Content: Thought content normal.         Cognition and Memory: Cognition and memory normal.         Judgment: Judgment normal.        Result Review :            Assessment and Plan     Diagnoses and all orders for this visit:    1. Seizure (Primary)  -     Lipid panel  -     CBC & Differential  -     Comprehensive metabolic panel  -     phenytoin ER (DILANTIN) 100 MG capsule; TAKE 3 CAPSULES IN THE     MORNING AND 3 CAPSULES IN  THE EVENING (DISREGARD     BRAND NAME DILANTIN)  Dispense: 540 capsule; Refill: 1    2. Mixed hyperlipidemia  -     atorvastatin (LIPITOR) 20 MG tablet; Take 1 tablet by mouth Daily.  Dispense: 90 tablet; Refill: 1    3. Primary hypertension  -     CBC & Differential  -     Comprehensive metabolic panel  -     hydroCHLOROthiazide 25 MG tablet; Take 1 tablet by mouth Daily.  Dispense: 90 tablet; Refill: 1  -     losartan (COZAAR) 50 MG tablet; Take 1 tablet by mouth Daily.  Dispense: 90 tablet; Refill: 0         Follow Up     Return in about 6 months (around 1/22/2025).  Patient was given instructions and counseling regarding his condition or for health maintenance advice. Please see specific information pulled into the AVS if appropriate.     Electronically signed by Jamee Medina, IVETTE, APRN, 07/22/24, 7:47 AM CDT.

## 2024-07-23 LAB
ALBUMIN SERPL-MCNC: 4.4 G/DL (ref 3.5–5.2)
ALBUMIN/GLOB SERPL: 2.2 G/DL
ALP SERPL-CCNC: 92 U/L (ref 39–117)
ALT SERPL-CCNC: 29 U/L (ref 1–41)
AST SERPL-CCNC: 19 U/L (ref 1–40)
BASOPHILS # BLD AUTO: 0.02 10*3/MM3 (ref 0–0.2)
BASOPHILS NFR BLD AUTO: 0.4 % (ref 0–1.5)
BILIRUB SERPL-MCNC: 0.3 MG/DL (ref 0–1.2)
BUN SERPL-MCNC: 12 MG/DL (ref 8–23)
BUN/CREAT SERPL: 15 (ref 7–25)
CALCIUM SERPL-MCNC: 9.3 MG/DL (ref 8.6–10.5)
CHLORIDE SERPL-SCNC: 103 MMOL/L (ref 98–107)
CHOLEST SERPL-MCNC: 178 MG/DL (ref 0–200)
CO2 SERPL-SCNC: 25.8 MMOL/L (ref 22–29)
CREAT SERPL-MCNC: 0.8 MG/DL (ref 0.76–1.27)
EGFRCR SERPLBLD CKD-EPI 2021: 99.4 ML/MIN/1.73
EOSINOPHIL # BLD AUTO: 0.07 10*3/MM3 (ref 0–0.4)
EOSINOPHIL NFR BLD AUTO: 1.3 % (ref 0.3–6.2)
ERYTHROCYTE [DISTWIDTH] IN BLOOD BY AUTOMATED COUNT: 13.6 % (ref 12.3–15.4)
GLOBULIN SER CALC-MCNC: 2 GM/DL
GLUCOSE SERPL-MCNC: 113 MG/DL (ref 65–99)
HCT VFR BLD AUTO: 43.3 % (ref 37.5–51)
HDLC SERPL-MCNC: 46 MG/DL (ref 40–60)
HGB BLD-MCNC: 14.2 G/DL (ref 13–17.7)
IMM GRANULOCYTES # BLD AUTO: 0.03 10*3/MM3 (ref 0–0.05)
IMM GRANULOCYTES NFR BLD AUTO: 0.6 % (ref 0–0.5)
LDLC SERPL CALC-MCNC: 102 MG/DL (ref 0–100)
LYMPHOCYTES # BLD AUTO: 1.38 10*3/MM3 (ref 0.7–3.1)
LYMPHOCYTES NFR BLD AUTO: 25.8 % (ref 19.6–45.3)
MCH RBC QN AUTO: 29.5 PG (ref 26.6–33)
MCHC RBC AUTO-ENTMCNC: 32.8 G/DL (ref 31.5–35.7)
MCV RBC AUTO: 90 FL (ref 79–97)
MONOCYTES # BLD AUTO: 0.38 10*3/MM3 (ref 0.1–0.9)
MONOCYTES NFR BLD AUTO: 7.1 % (ref 5–12)
NEUTROPHILS # BLD AUTO: 3.46 10*3/MM3 (ref 1.7–7)
NEUTROPHILS NFR BLD AUTO: 64.8 % (ref 42.7–76)
NRBC BLD AUTO-RTO: 0 /100 WBC (ref 0–0.2)
PLATELET # BLD AUTO: 187 10*3/MM3 (ref 140–450)
POTASSIUM SERPL-SCNC: 4.1 MMOL/L (ref 3.5–5.2)
PROT SERPL-MCNC: 6.4 G/DL (ref 6–8.5)
RBC # BLD AUTO: 4.81 10*6/MM3 (ref 4.14–5.8)
SODIUM SERPL-SCNC: 138 MMOL/L (ref 136–145)
TRIGL SERPL-MCNC: 175 MG/DL (ref 0–150)
VLDLC SERPL CALC-MCNC: 30 MG/DL (ref 5–40)
WBC # BLD AUTO: 5.34 10*3/MM3 (ref 3.4–10.8)

## 2024-08-11 DIAGNOSIS — M79.605 PAIN IN BOTH LOWER EXTREMITIES: ICD-10-CM

## 2024-08-11 DIAGNOSIS — M79.604 PAIN IN BOTH LOWER EXTREMITIES: ICD-10-CM

## 2024-08-13 RX ORDER — DICLOFENAC SODIUM 75 MG/1
75 TABLET, DELAYED RELEASE ORAL 2 TIMES DAILY
Qty: 180 TABLET | Refills: 1 | Status: SHIPPED | OUTPATIENT
Start: 2024-08-13

## 2024-08-13 NOTE — TELEPHONE ENCOUNTER
Rx Refill Note  Requested Prescriptions     Pending Prescriptions Disp Refills    diclofenac (VOLTAREN) 75 MG EC tablet [Pharmacy Med Name: DICLOFEN SOD TAB 75MG EC] 180 tablet 1     Sig: TAKE 1 TABLET TWICE A DAY      Last office visit with prescribing clinician: 7/22/2024   Last telemedicine visit with prescribing clinician: Visit date not found   Next office visit with prescribing clinician: 9/13/2024                         Would you like a call back once the refill request has been completed: [] Yes [] No    If the office needs to give you a call back, can they leave a voicemail: [] Yes [] No    Svetlana Santana LPN  08/13/24, 12:56 CDT

## 2024-11-20 DIAGNOSIS — E78.2 MIXED HYPERLIPIDEMIA: ICD-10-CM

## 2024-11-21 NOTE — TELEPHONE ENCOUNTER
Rx Refill Note  Requested Prescriptions     Pending Prescriptions Disp Refills    atorvastatin (LIPITOR) 20 MG tablet 90 tablet 1     Sig: Take 1 tablet by mouth Daily.      Last office visit with prescribing clinician: 7/22/2024   Last telemedicine visit with prescribing clinician: Visit date not found   Next office visit with prescribing clinician: 1/22/2025                         Would you like a call back once the refill request has been completed: [] Yes [] No    If the office needs to give you a call back, can they leave a voicemail: [] Yes [] No    Candis Man MA  11/21/24, 14:00 CST

## 2024-11-22 RX ORDER — ATORVASTATIN CALCIUM 20 MG/1
20 TABLET, FILM COATED ORAL DAILY
Qty: 90 TABLET | Refills: 1 | Status: SHIPPED | OUTPATIENT
Start: 2024-11-22

## 2024-12-30 DIAGNOSIS — R56.9 SEIZURE: ICD-10-CM

## 2024-12-30 RX ORDER — PHENYTOIN SODIUM 100 MG/1
CAPSULE, EXTENDED RELEASE ORAL
Qty: 540 CAPSULE | Refills: 1 | Status: SHIPPED | OUTPATIENT
Start: 2024-12-30

## 2024-12-30 NOTE — TELEPHONE ENCOUNTER
Rx Refill Note  Requested Prescriptions     Pending Prescriptions Disp Refills    phenytoin ER (DILANTIN) 100 MG capsule [Pharmacy Med Name: PHENYTOIN EX CAP 100MG] 540 capsule 1     Sig: TAKE 3 CAPSULES IN THE     MORNING AND EVENING        (DISREGARD BRAND NAME      DILANTIN)      Last office visit with prescribing clinician: 7/22/2024   Next office visit with prescribing clinician: 1/22/2025       Fabiola Rai CMA  12/30/24, 09:42 CST

## 2025-01-22 ENCOUNTER — OFFICE VISIT (OUTPATIENT)
Dept: FAMILY MEDICINE CLINIC | Facility: CLINIC | Age: 65
End: 2025-01-22
Payer: COMMERCIAL

## 2025-01-22 VITALS
DIASTOLIC BLOOD PRESSURE: 80 MMHG | HEART RATE: 71 BPM | WEIGHT: 298 LBS | BODY MASS INDEX: 42.66 KG/M2 | HEIGHT: 70 IN | TEMPERATURE: 98.6 F | RESPIRATION RATE: 20 BRPM | SYSTOLIC BLOOD PRESSURE: 130 MMHG | OXYGEN SATURATION: 95 %

## 2025-01-22 DIAGNOSIS — R56.9 SEIZURE: Primary | ICD-10-CM

## 2025-01-22 DIAGNOSIS — M79.605 PAIN IN BOTH LOWER EXTREMITIES: ICD-10-CM

## 2025-01-22 DIAGNOSIS — M79.604 PAIN IN BOTH LOWER EXTREMITIES: ICD-10-CM

## 2025-01-22 DIAGNOSIS — I10 PRIMARY HYPERTENSION: ICD-10-CM

## 2025-01-22 PROCEDURE — 99214 OFFICE O/P EST MOD 30 MIN: CPT | Performed by: NURSE PRACTITIONER

## 2025-01-22 RX ORDER — HYDROCHLOROTHIAZIDE 25 MG/1
25 TABLET ORAL DAILY
Qty: 90 TABLET | Refills: 1 | Status: SHIPPED | OUTPATIENT
Start: 2025-01-22

## 2025-01-22 RX ORDER — LOSARTAN POTASSIUM 50 MG/1
50 TABLET ORAL DAILY
Qty: 90 TABLET | Refills: 1 | Status: SHIPPED | OUTPATIENT
Start: 2025-01-22

## 2025-01-22 RX ORDER — DICLOFENAC SODIUM 75 MG/1
75 TABLET, DELAYED RELEASE ORAL 2 TIMES DAILY
Qty: 180 TABLET | Refills: 1 | Status: SHIPPED | OUTPATIENT
Start: 2025-01-22

## 2025-01-22 NOTE — PROGRESS NOTES
Chief Complaint  Seizures (Follow up )    Subjective        Brady Merlos presents to [facility]  History of Present Illness  History of Present Illness  The patient is a 64-year-old male who presents for follow-up of seizures, hyperlipidemia, and hypertension.    He has a chronic history of seizures, spanning 35 years, with no recent episodes reported. He recalls experiencing rhythmic jerking and eye blinking during a seizure episode, which occurred while he was off his medication. He has not required emergency room visits due to seizures. He reports no focality, fever, chills, or shortness of breath. He is currently on Dilantin for seizure management and has an adequate supply at home.    His hyperlipidemia is also chronic, persisting for over a year. He does not have any renal disease, hypothyroidism, or nephrotic syndrome. His condition is not exacerbated by any known factors. He reports no associated focal weakness, chest pain, or sensory loss. He has no issues with compliance, insurance, or medication. His coronary artery disease risk factors include male sex, obesity, dyslipidemia, and hypertension. He is currently on atorvastatin for cholesterol management.    He has been managing hypertension for over a year. His blood pressure readings have consistently been elevated. He reports no chest pain, palpitations, or blurred vision. He does not use any agents associated with hypertension such as anorectics, amphetamines, or thyroid hormones. His coronary artery disease risk factors remain the same, including dyslipidemia, family history, male gender, obesity, and hypertension. He has no compliance issues, kidney disease, heart failure, or peripheral vascular disease. His current treatment regimen includes losartan and HCTZ, which have resulted in mild improvement. Previous treatments have included lisinopril.    He has not had to take meclizine. He reports no dizziness or  "sanju.    He had a colonoscopy in 2019, during which a small polyp was found. He is due for another colonoscopy in 10/2025. He is up to date on his Tdap vaccine and is not due until 06/16/2027. He has not received the pneumonia vaccine and has declined the shingles vaccine.    MEDICATIONS  Current: Dilantin, atorvastatin, losartan, HCTZ, diclofenac  Past: lisinopril    IMMUNIZATIONS  He is up to date on his Tdap vaccine and is not due until 06/16/2027. He has not received the pneumonia vaccine and has declined the shingles vaccine.      The following portions of the patient's history were reviewed and updated as appropriate: allergies, current medications, past family history, past medical history, past social history, past surgical history and problem list.    Aspirin use is not indicated based on review of current medical condition/s.     Objective   Vital Signs:  /80 (BP Location: Right arm, Patient Position: Sitting, Cuff Size: Large Adult)   Pulse 71   Temp 98.6 °F (37 °C) (Infrared)   Resp 20   Ht 177.8 cm (70\")   Wt 135 kg (298 lb)   SpO2 95%   BMI 42.76 kg/m²   Estimated body mass index is 42.76 kg/m² as calculated from the following:    Height as of this encounter: 177.8 cm (70\").    Weight as of this encounter: 135 kg (298 lb).       Class 3 Severe Obesity (BMI >=40). Obesity-related health conditions include the following: hypertension and dyslipidemias. Obesity is unchanged. BMI is is above average; BMI management plan is completed. We discussed portion control and increasing exercise.    Physical Exam  Vitals and nursing note reviewed.   Constitutional:       General: He is awake.      Appearance: Normal appearance. He is well-developed and well-groomed.   HENT:      Head: Normocephalic and atraumatic.      Right Ear: Hearing, tympanic membrane, ear canal and external ear normal.      Left Ear: Hearing, tympanic membrane, ear canal and external ear normal.      Nose: Nose normal.      " Mouth/Throat:      Lips: Pink.      Pharynx: Oropharynx is clear.   Eyes:      General: Lids are normal.      Conjunctiva/sclera: Conjunctivae normal.   Cardiovascular:      Rate and Rhythm: Normal rate and regular rhythm.      Heart sounds: Normal heart sounds.   Pulmonary:      Effort: Pulmonary effort is normal.      Breath sounds: Normal breath sounds and air entry.   Musculoskeletal:      Cervical back: Full passive range of motion without pain.      Right lower leg: No edema.      Left lower leg: No edema.   Lymphadenopathy:      Head:      Right side of head: No submental, submandibular or tonsillar adenopathy.      Left side of head: No submental, submandibular or tonsillar adenopathy.   Skin:     General: Skin is warm and dry.   Neurological:      Mental Status: He is alert and oriented to person, place, and time.      Sensory: Sensation is intact.      Motor: Motor function is intact.      Coordination: Coordination is intact.      Gait: Gait is intact.   Psychiatric:         Attention and Perception: Attention and perception normal.         Mood and Affect: Mood and affect normal.         Speech: Speech normal.         Behavior: Behavior normal. Behavior is cooperative.         Thought Content: Thought content normal.         Cognition and Memory: Cognition and memory normal.         Judgment: Judgment normal.        Physical Exam  Vital Signs  Blood pressure is elevated at 154/77.    Result Review :          Results  Laboratory Studies  Lipid panel from July showed cholesterol 178, triglycerides 175, HDL 46, .           Assessment and Plan     Diagnoses and all orders for this visit:    1. Seizure (Primary)  -     Phenytoin level, total        -     Continue dilantin as prescribed (he has plenty at home)  2. Pain in both lower extremities  -     diclofenac (VOLTAREN) 75 MG EC tablet; Take 1 tablet by mouth 2 (Two) Times a Day.  Dispense: 180 tablet; Refill: 1    3. Primary hypertension  -     CBC  (No Diff)  -     Comprehensive metabolic panel  -     hydroCHLOROthiazide 25 MG tablet; Take 1 tablet by mouth Daily.  Dispense: 90 tablet; Refill: 1  -     losartan (COZAAR) 50 MG tablet; Take 1 tablet by mouth Daily.  Dispense: 90 tablet; Refill: 1      Assessment & Plan  1. Seizure disorder.  He has a chronic history of seizures for 35 years, with no recent episodes reported. He is currently on Dilantin and has an adequate supply at home. No refill is needed for Dilantin.    2. Hyperlipidemia.  His hyperlipidemia is chronic, persisting for over a year. His last lipid panel in July showed normal cholesterol at 178, elevated triglycerides at 175, HDL at 46, and elevated LDL at 102. He is currently on atorvastatin 20 mg. He is advised to continue taking the statin and work on diet and exercise. A refill for atorvastatin 20 mg has been provided.    3. Hypertension.  He has been managing hypertension for over a year. His blood pressure today is elevated at 154/77. He is currently on losartan and HCTZ, which have resulted in mild improvement. Refills for losartan and HCTZ have been provided.    4. Dizziness.  He has not had to take meclizine and reports no dizziness or spells. No refill is needed for meclizine.    5. Health maintenance.  He had a colonoscopy in 2019, during which a small polyp was found. He is due for another colonoscopy in 10/2025. He is up to date on his Tdap vaccine and is not due until 06/16/2027. He has not received the pneumonia vaccine and has declined the shingles vaccine.    Follow-up  The patient will follow up in 6 months.    PROCEDURE  Colonoscopy in 2019 revealed a small polyp.      ICD-10-CM ICD-9-CM   1. Seizure  R56.9 780.39   2. Pain in both lower extremities  M79.604 729.5    M79.605    3. Primary hypertension  I10 401.9                Follow Up     Return in about 6 months (around 7/22/2025) for Recheck.  Patient was given instructions and counseling regarding his condition or for  health maintenance advice. Please see specific information pulled into the AVS if appropriate.       Patient or patient representative verbalized consent for the use of Ambient Listening during the visit with  Jamee Medina DNP, APRN for chart documentation. 1/22/2025  08:02 CST    Electronically signed by Jamee Medina DNP, APRN, 01/22/25, 8:03 AM CST.

## 2025-01-23 LAB
ALBUMIN SERPL-MCNC: 4.3 G/DL (ref 3.5–5.2)
ALBUMIN/GLOB SERPL: 1.7 G/DL
ALP SERPL-CCNC: 95 U/L (ref 39–117)
ALT SERPL-CCNC: 30 U/L (ref 1–41)
AST SERPL-CCNC: 21 U/L (ref 1–40)
BILIRUB SERPL-MCNC: 0.4 MG/DL (ref 0–1.2)
BUN SERPL-MCNC: 14 MG/DL (ref 8–23)
BUN/CREAT SERPL: 16.5 (ref 7–25)
CALCIUM SERPL-MCNC: 8.9 MG/DL (ref 8.6–10.5)
CHLORIDE SERPL-SCNC: 101 MMOL/L (ref 98–107)
CO2 SERPL-SCNC: 26 MMOL/L (ref 22–29)
CREAT SERPL-MCNC: 0.85 MG/DL (ref 0.76–1.27)
EGFRCR SERPLBLD CKD-EPI 2021: 97 ML/MIN/1.73
ERYTHROCYTE [DISTWIDTH] IN BLOOD BY AUTOMATED COUNT: 13 % (ref 12.3–15.4)
GLOBULIN SER CALC-MCNC: 2.5 GM/DL
GLUCOSE SERPL-MCNC: 131 MG/DL (ref 65–99)
HCT VFR BLD AUTO: 44.1 % (ref 37.5–51)
HGB BLD-MCNC: 15 G/DL (ref 13–17.7)
MCH RBC QN AUTO: 30.3 PG (ref 26.6–33)
MCHC RBC AUTO-ENTMCNC: 34 G/DL (ref 31.5–35.7)
MCV RBC AUTO: 89.1 FL (ref 79–97)
PHENYTOIN SERPL-MCNC: 8.8 MCG/ML (ref 10–20)
PLATELET # BLD AUTO: 193 10*3/MM3 (ref 140–450)
POTASSIUM SERPL-SCNC: 4 MMOL/L (ref 3.5–5.2)
PROT SERPL-MCNC: 6.8 G/DL (ref 6–8.5)
RBC # BLD AUTO: 4.95 10*6/MM3 (ref 4.14–5.8)
SODIUM SERPL-SCNC: 140 MMOL/L (ref 136–145)
WBC # BLD AUTO: 5.34 10*3/MM3 (ref 3.4–10.8)

## 2025-01-26 DIAGNOSIS — R56.9 SEIZURES: Primary | ICD-10-CM

## 2025-01-29 ENCOUNTER — TELEPHONE (OUTPATIENT)
Dept: FAMILY MEDICINE CLINIC | Facility: CLINIC | Age: 65
End: 2025-01-29
Payer: COMMERCIAL

## 2025-05-02 DIAGNOSIS — E78.2 MIXED HYPERLIPIDEMIA: ICD-10-CM

## 2025-05-02 RX ORDER — ATORVASTATIN CALCIUM 20 MG/1
20 TABLET, FILM COATED ORAL DAILY
Qty: 90 TABLET | Refills: 1 | Status: SHIPPED | OUTPATIENT
Start: 2025-05-02

## 2025-05-02 NOTE — TELEPHONE ENCOUNTER
Rx Refill Note  Requested Prescriptions     Pending Prescriptions Disp Refills    atorvastatin (LIPITOR) 20 MG tablet [Pharmacy Med Name: ATORVASTATIN TAB 20MG] 90 tablet 1     Sig: TAKE 1 TABLET DAILY      Last office visit with prescribing clinician: 1/22/2025     Next office visit with prescribing clinician: 7/21/2025       Candis Man MA  05/02/25, 08:40 CDT

## 2025-07-03 DIAGNOSIS — R56.9 SEIZURE: ICD-10-CM

## 2025-07-03 DIAGNOSIS — I10 PRIMARY HYPERTENSION: ICD-10-CM

## 2025-07-09 RX ORDER — LOSARTAN POTASSIUM 50 MG/1
50 TABLET ORAL DAILY
Qty: 90 TABLET | Refills: 1 | Status: SHIPPED | OUTPATIENT
Start: 2025-07-09

## 2025-07-09 RX ORDER — PHENYTOIN SODIUM 100 MG/1
CAPSULE, EXTENDED RELEASE ORAL
Qty: 540 CAPSULE | Refills: 1 | Status: SHIPPED | OUTPATIENT
Start: 2025-07-09

## 2025-07-09 NOTE — TELEPHONE ENCOUNTER
Rx Refill Note  Requested Prescriptions     Pending Prescriptions Disp Refills    losartan (COZAAR) 50 MG tablet [Pharmacy Med Name: LOSARTAN POT TAB 50MG] 90 tablet 1     Sig: TAKE 1 TABLET DAILY    phenytoin ER (DILANTIN) 100 MG capsule [Pharmacy Med Name: PHENYTOIN EX CAP 100MG] 540 capsule 1     Sig: TAKE 3 CAPSULES IN THE     MORNING AND EVENING        (DISREGARD BRANDNAME      DILANTIN)      Last office visit with prescribing clinician: 1/22/2025   Next office visit with prescribing clinician: 7/21/2025     Lali Knox MA  07/09/25, 10:25 CDT

## 2025-07-15 ENCOUNTER — OFFICE VISIT (OUTPATIENT)
Dept: FAMILY MEDICINE CLINIC | Facility: CLINIC | Age: 65
End: 2025-07-15
Payer: COMMERCIAL

## 2025-07-15 VITALS
WEIGHT: 292 LBS | RESPIRATION RATE: 18 BRPM | TEMPERATURE: 98.6 F | HEART RATE: 70 BPM | DIASTOLIC BLOOD PRESSURE: 73 MMHG | SYSTOLIC BLOOD PRESSURE: 113 MMHG | HEIGHT: 70 IN | OXYGEN SATURATION: 98 % | BODY MASS INDEX: 41.8 KG/M2

## 2025-07-15 VITALS
HEART RATE: 70 BPM | WEIGHT: 292 LBS | BODY MASS INDEX: 41.8 KG/M2 | OXYGEN SATURATION: 98 % | HEIGHT: 70 IN | TEMPERATURE: 98.6 F | RESPIRATION RATE: 18 BRPM | DIASTOLIC BLOOD PRESSURE: 73 MMHG | SYSTOLIC BLOOD PRESSURE: 113 MMHG

## 2025-07-15 DIAGNOSIS — E66.01 MORBID (SEVERE) OBESITY DUE TO EXCESS CALORIES: ICD-10-CM

## 2025-07-15 DIAGNOSIS — M79.605 PAIN IN BOTH LOWER EXTREMITIES: ICD-10-CM

## 2025-07-15 DIAGNOSIS — E78.2 MIXED HYPERLIPIDEMIA: ICD-10-CM

## 2025-07-15 DIAGNOSIS — I10 ESSENTIAL HYPERTENSION: ICD-10-CM

## 2025-07-15 DIAGNOSIS — M79.604 PAIN IN BOTH LOWER EXTREMITIES: ICD-10-CM

## 2025-07-15 DIAGNOSIS — Z12.5 SCREENING FOR PROSTATE CANCER: ICD-10-CM

## 2025-07-15 DIAGNOSIS — R56.9 SEIZURES: ICD-10-CM

## 2025-07-15 DIAGNOSIS — Z00.01 ENCOUNTER FOR WELL ADULT EXAM WITH ABNORMAL FINDINGS: Primary | ICD-10-CM

## 2025-07-15 DIAGNOSIS — G47.30 SLEEP APNEA IN ADULT: ICD-10-CM

## 2025-07-15 DIAGNOSIS — I10 PRIMARY HYPERTENSION: Primary | ICD-10-CM

## 2025-07-15 PROCEDURE — 99396 PREV VISIT EST AGE 40-64: CPT | Performed by: NURSE PRACTITIONER

## 2025-07-15 PROCEDURE — 99214 OFFICE O/P EST MOD 30 MIN: CPT | Performed by: NURSE PRACTITIONER

## 2025-07-15 RX ORDER — LOSARTAN POTASSIUM 50 MG/1
50 TABLET ORAL DAILY
Qty: 90 TABLET | Refills: 1 | Status: SHIPPED | OUTPATIENT
Start: 2025-07-15

## 2025-07-15 RX ORDER — DICLOFENAC SODIUM 75 MG/1
75 TABLET, DELAYED RELEASE ORAL 2 TIMES DAILY
Qty: 180 TABLET | Refills: 1 | Status: SHIPPED | OUTPATIENT
Start: 2025-07-15

## 2025-07-15 RX ORDER — HYDROCHLOROTHIAZIDE 25 MG/1
25 TABLET ORAL DAILY
Qty: 90 TABLET | Refills: 1 | Status: SHIPPED | OUTPATIENT
Start: 2025-07-15
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
## 2025-07-15 NOTE — PROGRESS NOTES
Subjective   Annual well      Brady Merlos is a 64 y.o. patient who presents for an annual Wellness.    The following portions of the patient's history were reviewed and   updated as appropriate: allergies, current medications, past family history, past medical history, past social history, past surgical history, and problem list.    Compared to one year ago, the patient's physical health is the same.  Compared to one year ago, the patient's mental health is the same.    Recent Hospitalizations:  He was not admitted to the hospital during the last year.     Current Medical Providers:  Patient Care Team:  Jamee Medina DNP, KAREEM as PCP - General  Jamee Medina DNP, APRN as PCP - Family Medicine    Outpatient Medications Prior to Visit   Medication Sig Dispense Refill    atorvastatin (LIPITOR) 20 MG tablet TAKE 1 TABLET DAILY 90 tablet 1    diclofenac (VOLTAREN) 75 MG EC tablet Take 1 tablet by mouth 2 (Two) Times a Day. 180 tablet 1    hydroCHLOROthiazide 25 MG tablet Take 1 tablet by mouth Daily. 90 tablet 1    losartan (COZAAR) 50 MG tablet Take 1 tablet by mouth Daily. 90 tablet 1    phenytoin ER (DILANTIN) 100 MG capsule TAKE 3 CAPSULES IN THE     MORNING AND EVENING        (DISREGARD BRANDNAME      DILANTIN) 540 capsule 1     No facility-administered medications prior to visit.     No opioid medication identified on active medication list. I have reviewed chart for other potential  high risk medication/s and harmful drug interactions in the elderly.      Aspirin is not on active medication list.  Aspirin use is not indicated based on review of current medical condition/s. Risk of harm outweighs potential benefits.  .    Patient Active Problem List   Diagnosis    Sleep apnea in adult    Sleep apnea, obstructive    Nuclear sclerosis, bilateral    Bilateral retinal lattice degeneration    Lattice degeneration of retina    Nuclear senile cataract    Round hole of retina without detachment     "Arthropathy of left knee    History of seizure    Hyperlipidemia    Seizure     Advance Care Planning Advance Directive is not on file.  ACP discussion was held with the patient during this visit. Patient does not have an advance directive, information provided.            Objective   Vitals:    07/15/25 0756   BP: 113/73   BP Location: Left arm   Patient Position: Sitting   Cuff Size: Large Adult   Pulse: 70   Resp: 18   Temp: 98.6 °F (37 °C)   TempSrc: Infrared   SpO2: 98%   Weight: 132 kg (292 lb)   Height: 177.8 cm (70\")   PainSc: 0-No pain       Estimated body mass index is 41.9 kg/m² as calculated from the following:    Height as of this encounter: 177.8 cm (70\").    Weight as of this encounter: 132 kg (292 lb).                Does the patient have evidence of cognitive impairment? No                                                                                               Health  Risk Assessment    Smoking Status:  Social History     Tobacco Use   Smoking Status Never   Smokeless Tobacco Never     Alcohol Consumption:  Social History     Substance and Sexual Activity   Alcohol Use No       Fall Risk Screen  STEADI Fall Risk Assessment was completed, and patient is at LOW risk for falls.Assessment completed on:7/15/2025    Depression Screening   Little interest or pleasure in doing things? Not at all   Feeling down, depressed, or hopeless? Not at all   PHQ-2 Total Score 0      Health Habits and Functional and Cognitive Screenin/8/2023    10:00 AM   Functional & Cognitive Status   Do you have difficulty preparing food and eating? No    Do you have difficulty bathing yourself, getting dressed or grooming yourself? No    Do you have difficulty using the toilet? No    Do you have difficulty moving around from place to place? No    Do you have trouble with steps or getting out of a bed or a chair? No   Current Diet Well Balanced Diet   Dental Exam Up to date   Eye Exam Up to date   Exercise (times per " week) 4 times per week   Do you need help using the phone?  No   Are you deaf or do you have serious difficulty hearing?  No   Do you need help to go to places out of walking distance? No   Do you need help shopping? No   Do you need help preparing meals?  No   Do you need help with housework?  No   Do you need help with laundry? No   Do you need help taking your medications? No   Do you need help managing money? No   Do you ever drive or ride in a car without wearing a seat belt? No   Have you felt unusual fatigue (could be tiredness), stress, anger or loneliness in the last month? No    Who do you live with? Spouse   If you need help, do you have trouble finding someone available to you? No   Have you been bothered in the last four weeks by sexual problems? No   Do you have difficulty concentrating, remembering or making decisions? No       Data saved with a previous flowsheet row definition           Age-appropriate Screening Schedule:  Refer to the list below for future screening recommendations based on patient's age, sex and/or medical conditions. Orders for these recommended tests are listed in the plan section. The patient has been provided with a written plan.    Health Maintenance List  Health Maintenance   Topic Date Due    LIPID PANEL  07/22/2025    COLORECTAL CANCER SCREENING  10/28/2025    Pneumococcal Vaccine 50+ (1 of 1 - PCV) 09/01/2025 (Originally 12/24/2010)    COVID-19 Vaccine (3 - 2024-25 season) 01/15/2026 (Originally 9/1/2024)    INFLUENZA VACCINE  10/01/2025    ANNUAL PHYSICAL  07/15/2026    TDAP/TD VACCINES (2 - Td or Tdap) 06/16/2027    HEPATITIS C SCREENING  Addressed    ZOSTER VACCINE  Discontinued   Physical Exam  Vitals and nursing note reviewed.   Constitutional:       General: He is awake.      Appearance: Normal appearance. He is well-developed and well-groomed.   HENT:      Head: Normocephalic and atraumatic.      Right Ear: Hearing, tympanic membrane, ear canal and external ear  normal.      Left Ear: Hearing, tympanic membrane, ear canal and external ear normal.      Nose: Nose normal.      Mouth/Throat:      Lips: Pink.      Pharynx: Oropharynx is clear.   Eyes:      General: Lids are normal.      Conjunctiva/sclera: Conjunctivae normal.   Cardiovascular:      Rate and Rhythm: Normal rate and regular rhythm.      Heart sounds: Normal heart sounds.   Pulmonary:      Effort: Pulmonary effort is normal.      Breath sounds: Normal breath sounds and air entry.   Musculoskeletal:      Cervical back: Full passive range of motion without pain.      Right lower leg: No edema.      Left lower leg: No edema.   Lymphadenopathy:      Head:      Right side of head: No submental, submandibular or tonsillar adenopathy.      Left side of head: No submental, submandibular or tonsillar adenopathy.   Skin:     General: Skin is warm and dry.   Neurological:      Mental Status: He is alert and oriented to person, place, and time.      Sensory: Sensation is intact.      Motor: Motor function is intact.      Coordination: Coordination is intact.      Gait: Gait is intact.   Psychiatric:         Attention and Perception: Attention and perception normal.         Mood and Affect: Mood and affect normal.         Speech: Speech normal.         Behavior: Behavior normal. Behavior is cooperative.         Thought Content: Thought content normal.         Cognition and Memory: Cognition and memory normal.         Judgment: Judgment normal.                                                                                                                                                  There are no diagnoses linked to this encounter.    Assessment & Plan  Encounter for well adult exam with abnormal findings         Morbid (severe) obesity due to excess calories  Patient's (Body mass index is 41.9 kg/m².) indicates that they are morbidly/severely obese (BMI > 40 or > 35 with obesity - related health condition) with health  conditions that include hypertension and dyslipidemias . Weight is unchanged. BMI  is above average; BMI management plan is completed. We discussed portion control and increasing exercise.               Follow Up:   Return in about 1 year (around 7/15/2026) for Annual physical.      Electronically signed by Jamee Medina DNP, KAREEM, 07/15/25, 8:08 AM CDT.

## 2025-07-16 LAB
ALBUMIN SERPL-MCNC: 4.2 G/DL (ref 3.5–5.2)
ALBUMIN/GLOB SERPL: 1.9 G/DL
ALP SERPL-CCNC: 92 U/L (ref 39–117)
ALT SERPL-CCNC: 27 U/L (ref 1–41)
AST SERPL-CCNC: 20 U/L (ref 1–40)
BILIRUB SERPL-MCNC: 0.2 MG/DL (ref 0–1.2)
BUN SERPL-MCNC: 13 MG/DL (ref 8–23)
BUN/CREAT SERPL: 16.9 (ref 7–25)
CALCIUM SERPL-MCNC: 8.9 MG/DL (ref 8.6–10.5)
CHLORIDE SERPL-SCNC: 102 MMOL/L (ref 98–107)
CHOLEST SERPL-MCNC: 155 MG/DL (ref 0–200)
CO2 SERPL-SCNC: 22 MMOL/L (ref 22–29)
CREAT SERPL-MCNC: 0.77 MG/DL (ref 0.76–1.27)
EGFRCR SERPLBLD CKD-EPI 2021: 100 ML/MIN/1.73
ERYTHROCYTE [DISTWIDTH] IN BLOOD BY AUTOMATED COUNT: 13.5 % (ref 12.3–15.4)
GLOBULIN SER CALC-MCNC: 2.2 GM/DL
GLUCOSE SERPL-MCNC: 120 MG/DL (ref 65–99)
HCT VFR BLD AUTO: 42.2 % (ref 37.5–51)
HDLC SERPL-MCNC: 43 MG/DL (ref 40–60)
HGB BLD-MCNC: 14.2 G/DL (ref 13–17.7)
LDLC SERPL CALC-MCNC: 85 MG/DL (ref 0–100)
MCH RBC QN AUTO: 30 PG (ref 26.6–33)
MCHC RBC AUTO-ENTMCNC: 33.6 G/DL (ref 31.5–35.7)
MCV RBC AUTO: 89.2 FL (ref 79–97)
PHENYTOIN SERPL-MCNC: 9.3 MCG/ML (ref 10–20)
PLATELET # BLD AUTO: 185 10*3/MM3 (ref 140–450)
POTASSIUM SERPL-SCNC: 4 MMOL/L (ref 3.5–5.2)
PROT SERPL-MCNC: 6.4 G/DL (ref 6–8.5)
PSA SERPL-MCNC: 0.54 NG/ML (ref 0–4)
RBC # BLD AUTO: 4.73 10*6/MM3 (ref 4.14–5.8)
SODIUM SERPL-SCNC: 138 MMOL/L (ref 136–145)
TRIGL SERPL-MCNC: 156 MG/DL (ref 0–150)
VLDLC SERPL CALC-MCNC: 27 MG/DL (ref 5–40)
WBC # BLD AUTO: 4.98 10*3/MM3 (ref 3.4–10.8)